# Patient Record
Sex: MALE | Race: WHITE | HISPANIC OR LATINO
[De-identification: names, ages, dates, MRNs, and addresses within clinical notes are randomized per-mention and may not be internally consistent; named-entity substitution may affect disease eponyms.]

---

## 2017-04-24 ENCOUNTER — APPOINTMENT (OUTPATIENT)
Dept: NEUROLOGY | Facility: HOSPITAL | Age: 37
End: 2017-04-24

## 2017-04-24 ENCOUNTER — OUTPATIENT (OUTPATIENT)
Dept: OUTPATIENT SERVICES | Facility: HOSPITAL | Age: 37
LOS: 1 days | End: 2017-04-24

## 2017-04-24 VITALS
SYSTOLIC BLOOD PRESSURE: 106 MMHG | DIASTOLIC BLOOD PRESSURE: 79 MMHG | BODY MASS INDEX: 26.66 KG/M2 | HEIGHT: 65 IN | WEIGHT: 160 LBS | HEART RATE: 63 BPM

## 2017-04-24 VITALS
WEIGHT: 160 LBS | HEIGHT: 65 IN | SYSTOLIC BLOOD PRESSURE: 106 MMHG | BODY MASS INDEX: 26.66 KG/M2 | DIASTOLIC BLOOD PRESSURE: 79 MMHG | HEART RATE: 63 BPM

## 2017-04-24 DIAGNOSIS — G40.909 EPILEPSY, UNSPECIFIED, NOT INTRACTABLE, WITHOUT STATUS EPILEPTICUS: ICD-10-CM

## 2017-08-02 ENCOUNTER — EMERGENCY (EMERGENCY)
Facility: HOSPITAL | Age: 37
LOS: 1 days | Discharge: HOME | End: 2017-08-02
Admitting: INTERNAL MEDICINE
Payer: SELF-PAY

## 2017-08-02 DIAGNOSIS — F10.10 ALCOHOL ABUSE, UNCOMPLICATED: ICD-10-CM
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
## 2017-08-02 DIAGNOSIS — G40.409 OTHER GENERALIZED EPILEPSY AND EPILEPTIC SYNDROMES, NOT INTRACTABLE, WITHOUT STATUS EPILEPTICUS: ICD-10-CM

## 2017-08-02 DIAGNOSIS — Z88.8 ALLERGY STATUS TO OTHER DRUGS, MEDICAMENTS AND BIOLOGICAL SUBSTANCES: ICD-10-CM

## 2017-08-02 DIAGNOSIS — Z87.891 PERSONAL HISTORY OF NICOTINE DEPENDENCE: ICD-10-CM

## 2017-08-02 PROCEDURE — 99053 MED SERV 10PM-8AM 24 HR FAC: CPT

## 2017-08-02 PROCEDURE — 93010 ELECTROCARDIOGRAM REPORT: CPT

## 2017-08-02 PROCEDURE — 99285 EMERGENCY DEPT VISIT HI MDM: CPT | Mod: 25

## 2017-08-03 PROCEDURE — 80048 BASIC METABOLIC PNL TOTAL CA: CPT

## 2017-08-03 PROCEDURE — 82553 CREATINE MB FRACTION: CPT

## 2017-08-03 PROCEDURE — 96375 TX/PRO/DX INJ NEW DRUG ADDON: CPT

## 2017-08-03 PROCEDURE — 99284 EMERGENCY DEPT VISIT MOD MDM: CPT | Mod: 25

## 2017-08-03 PROCEDURE — 85027 COMPLETE CBC AUTOMATED: CPT

## 2017-08-03 PROCEDURE — 83605 ASSAY OF LACTIC ACID: CPT

## 2017-08-03 PROCEDURE — 85610 PROTHROMBIN TIME: CPT

## 2017-08-03 PROCEDURE — 84484 ASSAY OF TROPONIN QUANT: CPT

## 2017-08-03 PROCEDURE — 80307 DRUG TEST PRSMV CHEM ANLYZR: CPT

## 2017-08-03 PROCEDURE — 83690 ASSAY OF LIPASE: CPT

## 2017-08-03 PROCEDURE — 93005 ELECTROCARDIOGRAM TRACING: CPT

## 2017-08-03 PROCEDURE — 82150 ASSAY OF AMYLASE: CPT

## 2017-08-03 PROCEDURE — 96368 THER/DIAG CONCURRENT INF: CPT

## 2017-08-03 PROCEDURE — 82550 ASSAY OF CK (CPK): CPT

## 2017-08-03 PROCEDURE — 96365 THER/PROPH/DIAG IV INF INIT: CPT

## 2017-08-03 PROCEDURE — 85730 THROMBOPLASTIN TIME PARTIAL: CPT

## 2017-08-03 PROCEDURE — 80076 HEPATIC FUNCTION PANEL: CPT

## 2017-08-03 PROCEDURE — 82009 KETONE BODYS QUAL: CPT

## 2017-08-08 ENCOUNTER — FORM ENCOUNTER (OUTPATIENT)
Age: 37
End: 2017-08-08

## 2017-08-09 ENCOUNTER — OUTPATIENT (OUTPATIENT)
Dept: OUTPATIENT SERVICES | Facility: HOSPITAL | Age: 37
LOS: 1 days | End: 2017-08-09
Payer: SELF-PAY

## 2017-08-09 ENCOUNTER — APPOINTMENT (OUTPATIENT)
Dept: FAMILY MEDICINE | Facility: HOSPITAL | Age: 37
End: 2017-08-09
Payer: SELF-PAY

## 2017-08-09 VITALS
OXYGEN SATURATION: 99 % | WEIGHT: 160 LBS | DIASTOLIC BLOOD PRESSURE: 85 MMHG | TEMPERATURE: 98.2 F | RESPIRATION RATE: 16 BRPM | HEART RATE: 74 BPM | BODY MASS INDEX: 26.63 KG/M2 | SYSTOLIC BLOOD PRESSURE: 122 MMHG

## 2017-08-09 DIAGNOSIS — M25.511 PAIN IN RIGHT SHOULDER: ICD-10-CM

## 2017-08-09 DIAGNOSIS — G40.909 EPILEPSY, UNSPECIFIED, NOT INTRACTABLE, WITHOUT STATUS EPILEPTICUS: ICD-10-CM

## 2017-08-09 DIAGNOSIS — Z78.9 OTHER SPECIFIED HEALTH STATUS: ICD-10-CM

## 2017-08-09 PROCEDURE — 36415 COLL VENOUS BLD VENIPUNCTURE: CPT

## 2017-08-09 PROCEDURE — 73030 X-RAY EXAM OF SHOULDER: CPT

## 2017-08-09 PROCEDURE — G0463: CPT

## 2017-08-09 PROCEDURE — 73030 X-RAY EXAM OF SHOULDER: CPT | Mod: 26,RT

## 2017-08-09 PROCEDURE — 80177 DRUG SCRN QUAN LEVETIRACETAM: CPT

## 2017-08-31 ENCOUNTER — APPOINTMENT (OUTPATIENT)
Dept: FAMILY MEDICINE | Facility: HOSPITAL | Age: 37
End: 2017-08-31

## 2017-09-05 ENCOUNTER — EMERGENCY (EMERGENCY)
Facility: HOSPITAL | Age: 37
LOS: 1 days | Discharge: HOME | End: 2017-09-05
Attending: INTERNAL MEDICINE | Admitting: EMERGENCY MEDICINE
Payer: SELF-PAY

## 2017-09-05 VITALS
HEIGHT: 65 IN | OXYGEN SATURATION: 97 % | RESPIRATION RATE: 16 BRPM | TEMPERATURE: 99 F | HEART RATE: 88 BPM | DIASTOLIC BLOOD PRESSURE: 75 MMHG | WEIGHT: 154.98 LBS | SYSTOLIC BLOOD PRESSURE: 119 MMHG

## 2017-09-05 VITALS
DIASTOLIC BLOOD PRESSURE: 77 MMHG | SYSTOLIC BLOOD PRESSURE: 109 MMHG | RESPIRATION RATE: 17 BRPM | TEMPERATURE: 98 F | OXYGEN SATURATION: 99 %

## 2017-09-05 PROCEDURE — 70450 CT HEAD/BRAIN W/O DYE: CPT | Mod: 26

## 2017-09-05 PROCEDURE — 99284 EMERGENCY DEPT VISIT MOD MDM: CPT

## 2017-09-05 PROCEDURE — 99284 EMERGENCY DEPT VISIT MOD MDM: CPT | Mod: 25

## 2017-09-05 PROCEDURE — 70450 CT HEAD/BRAIN W/O DYE: CPT

## 2017-09-05 RX ORDER — LEVETIRACETAM 250 MG/1
1000 TABLET, FILM COATED ORAL ONCE
Qty: 0 | Refills: 0 | Status: COMPLETED | OUTPATIENT
Start: 2017-09-05 | End: 2017-09-05

## 2017-09-05 RX ORDER — ONDANSETRON 8 MG/1
4 TABLET, FILM COATED ORAL ONCE
Qty: 0 | Refills: 0 | Status: COMPLETED | OUTPATIENT
Start: 2017-09-05 | End: 2017-09-05

## 2017-09-05 RX ADMIN — Medication 1 MILLIGRAM(S): at 08:27

## 2017-09-05 RX ADMIN — LEVETIRACETAM 400 MILLIGRAM(S): 250 TABLET, FILM COATED ORAL at 09:09

## 2017-09-05 RX ADMIN — ONDANSETRON 4 MILLIGRAM(S): 8 TABLET, FILM COATED ORAL at 08:29
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
## 2017-09-05 NOTE — ED PROVIDER NOTE - CHPI ED SYMPTOMS NEG
no numbness/no change in level of consciousness/no blurred vision/no confusion/no fever/no dizziness/no weakness
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
## 2017-10-23 ENCOUNTER — OUTPATIENT (OUTPATIENT)
Dept: OUTPATIENT SERVICES | Facility: HOSPITAL | Age: 37
LOS: 1 days | End: 2017-10-23

## 2017-10-23 ENCOUNTER — APPOINTMENT (OUTPATIENT)
Dept: NEUROLOGY | Facility: HOSPITAL | Age: 37
End: 2017-10-23

## 2017-10-23 VITALS
WEIGHT: 159 LBS | HEIGHT: 65 IN | DIASTOLIC BLOOD PRESSURE: 84 MMHG | BODY MASS INDEX: 26.49 KG/M2 | HEART RATE: 67 BPM | SYSTOLIC BLOOD PRESSURE: 110 MMHG

## 2017-10-24 DIAGNOSIS — G40.909 EPILEPSY, UNSPECIFIED, NOT INTRACTABLE, WITHOUT STATUS EPILEPTICUS: ICD-10-CM

## 2017-11-09 ENCOUNTER — APPOINTMENT (OUTPATIENT)
Dept: FAMILY MEDICINE | Facility: HOSPITAL | Age: 37
End: 2017-11-09

## 2017-11-27 ENCOUNTER — INPATIENT (INPATIENT)
Facility: HOSPITAL | Age: 37
LOS: 1 days | Discharge: HOME | DRG: 101 | End: 2017-11-29
Attending: HOSPITALIST | Admitting: HOSPITALIST
Payer: SELF-PAY

## 2017-11-27 ENCOUNTER — EMERGENCY (EMERGENCY)
Facility: HOSPITAL | Age: 37
LOS: 1 days | Discharge: HOME | End: 2017-11-27
Admitting: EMERGENCY MEDICINE
Payer: SELF-PAY

## 2017-11-27 DIAGNOSIS — G40.419 OTHER GENERALIZED EPILEPSY AND EPILEPTIC SYNDROMES, INTRACTABLE, WITHOUT STATUS EPILEPTICUS: ICD-10-CM

## 2017-11-27 DIAGNOSIS — G40.919 EPILEPSY, UNSPECIFIED, INTRACTABLE, WITHOUT STATUS EPILEPTICUS: ICD-10-CM

## 2017-11-27 PROCEDURE — 93010 ELECTROCARDIOGRAM REPORT: CPT | Mod: 76

## 2017-11-27 PROCEDURE — 70450 CT HEAD/BRAIN W/O DYE: CPT | Mod: 26

## 2017-11-27 PROCEDURE — 99222 1ST HOSP IP/OBS MODERATE 55: CPT

## 2017-11-27 PROCEDURE — 71010: CPT | Mod: 26

## 2017-11-27 PROCEDURE — 99285 EMERGENCY DEPT VISIT HI MDM: CPT

## 2017-11-28 PROCEDURE — 80307 DRUG TEST PRSMV CHEM ANLYZR: CPT

## 2017-11-28 PROCEDURE — 93005 ELECTROCARDIOGRAM TRACING: CPT

## 2017-11-28 PROCEDURE — 85027 COMPLETE CBC AUTOMATED: CPT

## 2017-11-28 PROCEDURE — 80053 COMPREHEN METABOLIC PANEL: CPT

## 2017-11-28 PROCEDURE — 80048 BASIC METABOLIC PNL TOTAL CA: CPT

## 2017-11-28 PROCEDURE — 84100 ASSAY OF PHOSPHORUS: CPT

## 2017-11-28 PROCEDURE — 99233 SBSQ HOSP IP/OBS HIGH 50: CPT | Mod: GC

## 2017-11-28 PROCEDURE — 36415 COLL VENOUS BLD VENIPUNCTURE: CPT

## 2017-11-28 PROCEDURE — 99285 EMERGENCY DEPT VISIT HI MDM: CPT | Mod: 25

## 2017-11-28 PROCEDURE — 80177 DRUG SCRN QUAN LEVETIRACETAM: CPT

## 2017-11-28 PROCEDURE — 99252 IP/OBS CONSLTJ NEW/EST SF 35: CPT

## 2017-11-28 PROCEDURE — 96374 THER/PROPH/DIAG INJ IV PUSH: CPT

## 2017-11-28 PROCEDURE — 80076 HEPATIC FUNCTION PANEL: CPT

## 2017-11-28 PROCEDURE — 83735 ASSAY OF MAGNESIUM: CPT

## 2017-11-28 PROCEDURE — 71045 X-RAY EXAM CHEST 1 VIEW: CPT

## 2017-11-28 PROCEDURE — 70450 CT HEAD/BRAIN W/O DYE: CPT

## 2017-11-28 PROCEDURE — 96375 TX/PRO/DX INJ NEW DRUG ADDON: CPT

## 2017-11-28 PROCEDURE — 99284 EMERGENCY DEPT VISIT MOD MDM: CPT | Mod: 25

## 2017-11-29 DIAGNOSIS — G40.919 EPILEPSY, UNSPECIFIED, INTRACTABLE, WITHOUT STATUS EPILEPTICUS: ICD-10-CM

## 2017-11-29 PROCEDURE — 99239 HOSP IP/OBS DSCHRG MGMT >30: CPT

## 2017-12-01 DIAGNOSIS — S09.90XA UNSPECIFIED INJURY OF HEAD, INITIAL ENCOUNTER: ICD-10-CM

## 2017-12-01 DIAGNOSIS — R56.9 UNSPECIFIED CONVULSIONS: ICD-10-CM
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
## 2017-12-01 DIAGNOSIS — S01.552A OPEN BITE OF ORAL CAVITY, INITIAL ENCOUNTER: ICD-10-CM

## 2017-12-01 DIAGNOSIS — Z79.899 OTHER LONG TERM (CURRENT) DRUG THERAPY: ICD-10-CM

## 2018-01-08 ENCOUNTER — OUTPATIENT (OUTPATIENT)
Dept: OUTPATIENT SERVICES | Facility: HOSPITAL | Age: 38
LOS: 1 days | End: 2018-01-08

## 2018-01-08 ENCOUNTER — APPOINTMENT (OUTPATIENT)
Dept: NEUROLOGY | Facility: HOSPITAL | Age: 38
End: 2018-01-08

## 2018-01-08 VITALS
WEIGHT: 160 LBS | DIASTOLIC BLOOD PRESSURE: 85 MMHG | BODY MASS INDEX: 26.66 KG/M2 | SYSTOLIC BLOOD PRESSURE: 129 MMHG | HEART RATE: 59 BPM | HEIGHT: 65 IN

## 2018-01-12 DIAGNOSIS — G40.909 EPILEPSY, UNSPECIFIED, NOT INTRACTABLE, WITHOUT STATUS EPILEPTICUS: ICD-10-CM

## 2018-01-12 DIAGNOSIS — G40.309 GENERALIZED IDIOPATHIC EPILEPSY AND EPILEPTIC SYNDROMES, NOT INTRACTABLE, WITHOUT STATUS EPILEPTICUS: ICD-10-CM

## 2018-04-23 ENCOUNTER — OUTPATIENT (OUTPATIENT)
Dept: OUTPATIENT SERVICES | Facility: HOSPITAL | Age: 38
LOS: 1 days | End: 2018-04-23

## 2018-04-23 ENCOUNTER — APPOINTMENT (OUTPATIENT)
Dept: NEUROLOGY | Facility: HOSPITAL | Age: 38
End: 2018-04-23
Payer: COMMERCIAL

## 2018-04-23 VITALS
HEART RATE: 56 BPM | BODY MASS INDEX: 25.33 KG/M2 | SYSTOLIC BLOOD PRESSURE: 109 MMHG | DIASTOLIC BLOOD PRESSURE: 78 MMHG | WEIGHT: 152 LBS | HEIGHT: 65 IN

## 2018-04-23 PROCEDURE — 99214 OFFICE O/P EST MOD 30 MIN: CPT

## 2018-04-24 DIAGNOSIS — G40.209 LOCALIZATION-RELATED (FOCAL) (PARTIAL) SYMPTOMATIC EPILEPSY AND EPILEPTIC SYNDROMES WITH COMPLEX PARTIAL SEIZURES, NOT INTRACTABLE, WITHOUT STATUS EPILEPTICUS: ICD-10-CM

## 2018-05-05 ENCOUNTER — APPOINTMENT (OUTPATIENT)
Dept: FAMILY MEDICINE | Facility: HOSPITAL | Age: 38
End: 2018-05-05

## 2018-05-05 ENCOUNTER — OUTPATIENT (OUTPATIENT)
Dept: OUTPATIENT SERVICES | Facility: HOSPITAL | Age: 38
LOS: 1 days | End: 2018-05-05
Payer: SELF-PAY

## 2018-05-05 VITALS
TEMPERATURE: 97.6 F | WEIGHT: 152 LBS | HEART RATE: 57 BPM | BODY MASS INDEX: 25.29 KG/M2 | DIASTOLIC BLOOD PRESSURE: 75 MMHG | OXYGEN SATURATION: 100 % | SYSTOLIC BLOOD PRESSURE: 112 MMHG | RESPIRATION RATE: 16 BRPM

## 2018-05-05 DIAGNOSIS — R04.0 EPISTAXIS: ICD-10-CM

## 2018-05-05 DIAGNOSIS — Z00.00 ENCOUNTER FOR GENERAL ADULT MEDICAL EXAMINATION WITHOUT ABNORMAL FINDINGS: ICD-10-CM

## 2018-05-05 PROCEDURE — G0463: CPT

## 2018-05-08 DIAGNOSIS — R04.0 EPISTAXIS: ICD-10-CM

## 2018-05-14 ENCOUNTER — APPOINTMENT (OUTPATIENT)
Dept: NEUROLOGY | Facility: HOSPITAL | Age: 38
End: 2018-05-14

## 2018-07-27 PROBLEM — Z78.9 ALCOHOL USE: Status: ACTIVE | Noted: 2017-08-10

## 2018-08-20 ENCOUNTER — EMERGENCY (EMERGENCY)
Facility: HOSPITAL | Age: 38
LOS: 1 days | Discharge: HOME | End: 2018-08-20
Attending: EMERGENCY MEDICINE | Admitting: EMERGENCY MEDICINE
Payer: SELF-PAY

## 2018-08-20 VITALS
OXYGEN SATURATION: 99 % | RESPIRATION RATE: 17 BRPM | DIASTOLIC BLOOD PRESSURE: 76 MMHG | HEART RATE: 80 BPM | TEMPERATURE: 98 F | SYSTOLIC BLOOD PRESSURE: 110 MMHG

## 2018-08-20 VITALS
DIASTOLIC BLOOD PRESSURE: 79 MMHG | SYSTOLIC BLOOD PRESSURE: 130 MMHG | RESPIRATION RATE: 18 BRPM | WEIGHT: 147.71 LBS | OXYGEN SATURATION: 99 % | HEART RATE: 59 BPM | TEMPERATURE: 99 F

## 2018-08-20 DIAGNOSIS — R41.82 ALTERED MENTAL STATUS, UNSPECIFIED: ICD-10-CM

## 2018-08-20 PROBLEM — R56.9 UNSPECIFIED CONVULSIONS: Chronic | Status: ACTIVE | Noted: 2017-09-05

## 2018-08-20 LAB
ALBUMIN SERPL ELPH-MCNC: 4.9 G/DL — SIGNIFICANT CHANGE UP (ref 3.3–5)
ALP SERPL-CCNC: 101 U/L — SIGNIFICANT CHANGE UP (ref 40–120)
ALT FLD-CCNC: 29 U/L DA — SIGNIFICANT CHANGE UP (ref 10–45)
AMPHET UR-MCNC: NEGATIVE — SIGNIFICANT CHANGE UP
ANION GAP SERPL CALC-SCNC: 22 MMOL/L — HIGH (ref 5–17)
APPEARANCE UR: CLEAR — SIGNIFICANT CHANGE UP
AST SERPL-CCNC: 23 U/L — SIGNIFICANT CHANGE UP (ref 10–40)
BARBITURATES UR SCN-MCNC: NEGATIVE — SIGNIFICANT CHANGE UP
BENZODIAZ UR-MCNC: NEGATIVE — SIGNIFICANT CHANGE UP
BILIRUB SERPL-MCNC: 0.3 MG/DL — SIGNIFICANT CHANGE UP (ref 0.2–1.2)
BILIRUB UR-MCNC: NEGATIVE — SIGNIFICANT CHANGE UP
BUN SERPL-MCNC: 21 MG/DL — SIGNIFICANT CHANGE UP (ref 7–23)
CALCIUM SERPL-MCNC: 9.5 MG/DL — SIGNIFICANT CHANGE UP (ref 8.4–10.5)
CHLORIDE SERPL-SCNC: 106 MMOL/L — SIGNIFICANT CHANGE UP (ref 96–108)
CO2 SERPL-SCNC: 15 MMOL/L — LOW (ref 22–31)
COCAINE METAB.OTHER UR-MCNC: NEGATIVE — SIGNIFICANT CHANGE UP
COLOR SPEC: YELLOW — SIGNIFICANT CHANGE UP
CREAT SERPL-MCNC: 1.23 MG/DL — SIGNIFICANT CHANGE UP (ref 0.5–1.3)
DIFF PNL FLD: NEGATIVE — SIGNIFICANT CHANGE UP
ETHANOL SERPL-MCNC: <3 MG/DL — SIGNIFICANT CHANGE UP (ref 0–3)
GLUCOSE SERPL-MCNC: 133 MG/DL — HIGH (ref 70–99)
GLUCOSE UR QL: NEGATIVE — SIGNIFICANT CHANGE UP
KETONES UR-MCNC: NEGATIVE — SIGNIFICANT CHANGE UP
LEUKOCYTE ESTERASE UR-ACNC: NEGATIVE — SIGNIFICANT CHANGE UP
METHADONE UR-MCNC: NEGATIVE — SIGNIFICANT CHANGE UP
NITRITE UR-MCNC: NEGATIVE — SIGNIFICANT CHANGE UP
OPIATES UR-MCNC: NEGATIVE — SIGNIFICANT CHANGE UP
PCP SPEC-MCNC: SIGNIFICANT CHANGE UP
PCP UR-MCNC: NEGATIVE — SIGNIFICANT CHANGE UP
PH UR: 5 — SIGNIFICANT CHANGE UP (ref 5–8)
POTASSIUM SERPL-MCNC: 3.3 MMOL/L — LOW (ref 3.5–5.3)
POTASSIUM SERPL-SCNC: 3.3 MMOL/L — LOW (ref 3.5–5.3)
PROT SERPL-MCNC: 9.2 G/DL — HIGH (ref 6–8.3)
PROT UR-MCNC: NEGATIVE — SIGNIFICANT CHANGE UP
SODIUM SERPL-SCNC: 143 MMOL/L — SIGNIFICANT CHANGE UP (ref 135–145)
SP GR SPEC: 1.01 — SIGNIFICANT CHANGE UP (ref 1.01–1.02)
THC UR QL: NEGATIVE — SIGNIFICANT CHANGE UP
UROBILINOGEN FLD QL: NEGATIVE — SIGNIFICANT CHANGE UP

## 2018-08-20 PROCEDURE — 96372 THER/PROPH/DIAG INJ SC/IM: CPT | Mod: XU

## 2018-08-20 PROCEDURE — 80053 COMPREHEN METABOLIC PANEL: CPT

## 2018-08-20 PROCEDURE — 70450 CT HEAD/BRAIN W/O DYE: CPT | Mod: 26

## 2018-08-20 PROCEDURE — 80307 DRUG TEST PRSMV CHEM ANLYZR: CPT

## 2018-08-20 PROCEDURE — 80177 DRUG SCRN QUAN LEVETIRACETAM: CPT

## 2018-08-20 PROCEDURE — 99284 EMERGENCY DEPT VISIT MOD MDM: CPT | Mod: 25

## 2018-08-20 PROCEDURE — 70450 CT HEAD/BRAIN W/O DYE: CPT

## 2018-08-20 PROCEDURE — 99285 EMERGENCY DEPT VISIT HI MDM: CPT

## 2018-08-20 PROCEDURE — 96374 THER/PROPH/DIAG INJ IV PUSH: CPT

## 2018-08-20 RX ORDER — SODIUM CHLORIDE 9 MG/ML
1000 INJECTION INTRAMUSCULAR; INTRAVENOUS; SUBCUTANEOUS ONCE
Qty: 0 | Refills: 0 | Status: COMPLETED | OUTPATIENT
Start: 2018-08-20 | End: 2018-08-20

## 2018-08-20 RX ORDER — POTASSIUM CHLORIDE 20 MEQ
40 PACKET (EA) ORAL ONCE
Qty: 0 | Refills: 0 | Status: COMPLETED | OUTPATIENT
Start: 2018-08-20 | End: 2018-08-20

## 2018-08-20 RX ORDER — LEVETIRACETAM 250 MG/1
500 TABLET, FILM COATED ORAL ONCE
Qty: 0 | Refills: 0 | Status: COMPLETED | OUTPATIENT
Start: 2018-08-20 | End: 2018-08-20

## 2018-08-20 RX ADMIN — SODIUM CHLORIDE 1000 MILLILITER(S): 9 INJECTION INTRAMUSCULAR; INTRAVENOUS; SUBCUTANEOUS at 11:08

## 2018-08-20 RX ADMIN — LEVETIRACETAM 500 MILLIGRAM(S): 250 TABLET, FILM COATED ORAL at 11:44

## 2018-08-20 RX ADMIN — Medication 2 MILLIGRAM(S): at 10:38

## 2018-08-20 RX ADMIN — LEVETIRACETAM 400 MILLIGRAM(S): 250 TABLET, FILM COATED ORAL at 11:29

## 2018-08-20 RX ADMIN — SODIUM CHLORIDE 2000 MILLILITER(S): 9 INJECTION INTRAMUSCULAR; INTRAVENOUS; SUBCUTANEOUS at 10:38

## 2018-08-20 RX ADMIN — SODIUM CHLORIDE 2000 MILLILITER(S): 9 INJECTION INTRAMUSCULAR; INTRAVENOUS; SUBCUTANEOUS at 13:15

## 2018-08-20 RX ADMIN — Medication 40 MILLIEQUIVALENT(S): at 15:41

## 2018-08-20 NOTE — ED PROVIDER NOTE - PROGRESS NOTE DETAILS
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 discussed importance of no alcohol or drug use. pt had eeg 6 yrs ago pt in stretcher, had witnessed (by myself) 30 second tonic clonic seizure with tongue biting, ativan given  pt's bloodwork drawn after seizure

## 2018-08-20 NOTE — ED ADULT NURSE REASSESSMENT NOTE - NS ED NURSE REASSESS COMMENT FT1
patient had witnessed seizure during medical assessment, Ativan 2 mg IM administered aS PER md uROVISH, Patient placed on oxygen via NC at 2 LPM>

## 2018-08-20 NOTE — ED PROVIDER NOTE - PHYSICAL EXAMINATION
Gen:  alert, awake, no acute distress; slow to answer questions  Head:  atraumatic, normocephalic  HEENT: PERRLA, EOMI, normal nose, normal oropharynx, no tonsillar edema, erythema, or exudate  CV:  rrr, nl S1, S2, no m/r/g  Pulm:  lungs CTA b/l  Abd: s/nt/nd, +BS  MSK:  moving all extremities, no back midline ttp, no stepoffs, no cva TTP  Neuro:  grossly intact, no focal deficits  Skin:  clear, dry, intact  Psych: AOx3, normal affect, no apparent risk to self or others

## 2018-08-20 NOTE — ED PROVIDER NOTE - OBJECTIVE STATEMENT
Pt with hx epiliepsy, on keppra 1500mg bid, has been compliant, no recent medication changes, had last seizure 6/8/18. Pt states he woke up this am and was feeling well, then went to work and started feeling dizzy and states not feeling well. Pt here with female, she states he is confused and slow to answer. States this has happened in past before seizures. Pt denies other complaints.

## 2018-08-20 NOTE — ED PROVIDER NOTE - NS ED ROS FT
Except as otherwise indicated in HPI:  CONSTITUTIONAL: Neg  HEENT: neg  CV: neg  Resp: neg  GI: Neg  : Neg  MSK: Neg  SKIN: Neg  NEURO: +dizziness, fatigue  PSYCHIATRIC: Neg

## 2018-08-21 LAB — LEVETIRACETAM SERPL-MCNC: 40 MCG/ML — SIGNIFICANT CHANGE UP (ref 12–46)

## 2018-09-17 ENCOUNTER — OUTPATIENT (OUTPATIENT)
Dept: OUTPATIENT SERVICES | Facility: HOSPITAL | Age: 38
LOS: 1 days | End: 2018-09-17

## 2018-09-17 ENCOUNTER — APPOINTMENT (OUTPATIENT)
Dept: NEUROLOGY | Facility: HOSPITAL | Age: 38
End: 2018-09-17

## 2018-09-17 VITALS
WEIGHT: 145 LBS | SYSTOLIC BLOOD PRESSURE: 108 MMHG | HEIGHT: 65 IN | HEART RATE: 61 BPM | BODY MASS INDEX: 24.16 KG/M2 | DIASTOLIC BLOOD PRESSURE: 65 MMHG

## 2018-09-17 DIAGNOSIS — G40.319 GENERALIZED IDIOPATHIC EPILEPSY AND EPILEPTIC SYNDROMES, INTRACTABLE, WITHOUT STATUS EPILEPTICUS: ICD-10-CM

## 2018-09-18 DIAGNOSIS — G40.909 EPILEPSY, UNSPECIFIED, NOT INTRACTABLE, WITHOUT STATUS EPILEPTICUS: ICD-10-CM

## 2018-10-22 ENCOUNTER — APPOINTMENT (OUTPATIENT)
Dept: NEUROLOGY | Facility: HOSPITAL | Age: 38
End: 2018-10-22

## 2018-10-23 ENCOUNTER — APPOINTMENT (OUTPATIENT)
Dept: NEUROLOGY | Facility: HOSPITAL | Age: 38
End: 2018-10-23

## 2018-12-06 ENCOUNTER — APPOINTMENT (OUTPATIENT)
Dept: NEUROLOGY | Facility: HOSPITAL | Age: 38
End: 2018-12-06

## 2018-12-06 ENCOUNTER — OUTPATIENT (OUTPATIENT)
Dept: OUTPATIENT SERVICES | Facility: HOSPITAL | Age: 38
LOS: 1 days | End: 2018-12-06
Payer: COMMERCIAL

## 2018-12-06 VITALS
DIASTOLIC BLOOD PRESSURE: 75 MMHG | BODY MASS INDEX: 24.16 KG/M2 | SYSTOLIC BLOOD PRESSURE: 123 MMHG | HEIGHT: 65 IN | HEART RATE: 63 BPM | RESPIRATION RATE: 14 BRPM | WEIGHT: 145 LBS

## 2018-12-06 DIAGNOSIS — G40.319 GENERALIZED IDIOPATHIC EPILEPSY AND EPILEPTIC SYNDROMES, INTRACTABLE, WITHOUT STATUS EPILEPTICUS: ICD-10-CM

## 2018-12-06 DIAGNOSIS — R56.9 UNSPECIFIED CONVULSIONS: ICD-10-CM

## 2018-12-06 PROCEDURE — G0463: CPT

## 2018-12-06 RX ORDER — RANITIDINE 150 MG/1
150 TABLET ORAL
Qty: 30 | Refills: 0 | Status: DISCONTINUED | COMMUNITY
Start: 2017-08-09 | End: 2018-12-06

## 2018-12-06 RX ORDER — FLUTICASONE PROPIONATE 50 UG/1
50 SPRAY, METERED NASAL DAILY
Qty: 16 | Refills: 0 | Status: DISCONTINUED | COMMUNITY
Start: 2018-05-05 | End: 2018-12-06

## 2018-12-06 RX ORDER — NAPROXEN 500 MG/1
500 TABLET ORAL
Qty: 20 | Refills: 0 | Status: DISCONTINUED | COMMUNITY
Start: 2017-08-09 | End: 2018-12-06

## 2018-12-30 ENCOUNTER — INPATIENT (INPATIENT)
Facility: HOSPITAL | Age: 38
LOS: 0 days | Discharge: HOME | DRG: 101 | End: 2018-12-31
Attending: PSYCHIATRY & NEUROLOGY | Admitting: PSYCHIATRY & NEUROLOGY
Payer: COMMERCIAL

## 2018-12-30 VITALS
TEMPERATURE: 98 F | DIASTOLIC BLOOD PRESSURE: 73 MMHG | RESPIRATION RATE: 18 BRPM | OXYGEN SATURATION: 98 % | SYSTOLIC BLOOD PRESSURE: 117 MMHG | HEART RATE: 54 BPM

## 2018-12-30 DIAGNOSIS — G40.909 EPILEPSY, UNSPECIFIED, NOT INTRACTABLE, WITHOUT STATUS EPILEPTICUS: ICD-10-CM

## 2018-12-30 LAB
ANION GAP SERPL CALC-SCNC: 13 MMOL/L — SIGNIFICANT CHANGE UP (ref 5–17)
APTT BLD: 30.7 SEC — SIGNIFICANT CHANGE UP (ref 27.5–36.3)
BUN SERPL-MCNC: 18 MG/DL — SIGNIFICANT CHANGE UP (ref 7–23)
CALCIUM SERPL-MCNC: 9.2 MG/DL — SIGNIFICANT CHANGE UP (ref 8.4–10.5)
CHLORIDE SERPL-SCNC: 107 MMOL/L — SIGNIFICANT CHANGE UP (ref 96–108)
CO2 SERPL-SCNC: 22 MMOL/L — SIGNIFICANT CHANGE UP (ref 22–31)
CREAT SERPL-MCNC: 1.02 MG/DL — SIGNIFICANT CHANGE UP (ref 0.5–1.3)
GLUCOSE SERPL-MCNC: 84 MG/DL — SIGNIFICANT CHANGE UP (ref 70–99)
HCT VFR BLD CALC: 42.6 % — SIGNIFICANT CHANGE UP (ref 39–50)
HGB BLD-MCNC: 14.5 G/DL — SIGNIFICANT CHANGE UP (ref 13–17)
INR BLD: 1.02 RATIO — SIGNIFICANT CHANGE UP (ref 0.88–1.16)
MCHC RBC-ENTMCNC: 29.4 PG — SIGNIFICANT CHANGE UP (ref 27–34)
MCHC RBC-ENTMCNC: 34 GM/DL — SIGNIFICANT CHANGE UP (ref 32–36)
MCV RBC AUTO: 86.4 FL — SIGNIFICANT CHANGE UP (ref 80–100)
PLATELET # BLD AUTO: 220 K/UL — SIGNIFICANT CHANGE UP (ref 150–400)
POTASSIUM SERPL-MCNC: 3.9 MMOL/L — SIGNIFICANT CHANGE UP (ref 3.5–5.3)
POTASSIUM SERPL-SCNC: 3.9 MMOL/L — SIGNIFICANT CHANGE UP (ref 3.5–5.3)
PROTHROM AB SERPL-ACNC: 11.4 SEC — SIGNIFICANT CHANGE UP (ref 10–13.1)
RBC # BLD: 4.93 M/UL — SIGNIFICANT CHANGE UP (ref 4.2–5.8)
RBC # FLD: 12.9 % — SIGNIFICANT CHANGE UP (ref 10.3–14.5)
SODIUM SERPL-SCNC: 142 MMOL/L — SIGNIFICANT CHANGE UP (ref 135–145)
WBC # BLD: 6.23 K/UL — SIGNIFICANT CHANGE UP (ref 3.8–10.5)
WBC # FLD AUTO: 6.23 K/UL — SIGNIFICANT CHANGE UP (ref 3.8–10.5)

## 2018-12-30 PROCEDURE — 95951: CPT | Mod: 26

## 2018-12-30 PROCEDURE — 95819 EEG AWAKE AND ASLEEP: CPT | Mod: 26,59

## 2018-12-30 RX ORDER — IBUPROFEN 200 MG
400 TABLET ORAL EVERY 6 HOURS
Qty: 0 | Refills: 0 | Status: DISCONTINUED | OUTPATIENT
Start: 2018-12-30 | End: 2018-12-31

## 2018-12-30 RX ORDER — LEVETIRACETAM 250 MG/1
1500 TABLET, FILM COATED ORAL
Qty: 0 | Refills: 0 | Status: DISCONTINUED | OUTPATIENT
Start: 2018-12-30 | End: 2018-12-31

## 2018-12-30 RX ORDER — ENOXAPARIN SODIUM 100 MG/ML
40 INJECTION SUBCUTANEOUS EVERY 24 HOURS
Qty: 0 | Refills: 0 | Status: DISCONTINUED | OUTPATIENT
Start: 2018-12-30 | End: 2018-12-31

## 2018-12-30 RX ORDER — ZONISAMIDE 100 MG
200 CAPSULE ORAL AT BEDTIME
Qty: 0 | Refills: 0 | Status: DISCONTINUED | OUTPATIENT
Start: 2018-12-30 | End: 2018-12-31

## 2018-12-30 RX ORDER — LEVETIRACETAM 250 MG/1
1250 TABLET, FILM COATED ORAL
Qty: 0 | Refills: 0 | COMMUNITY

## 2018-12-30 RX ADMIN — ENOXAPARIN SODIUM 40 MILLIGRAM(S): 100 INJECTION SUBCUTANEOUS at 18:10

## 2018-12-30 RX ADMIN — LEVETIRACETAM 1500 MILLIGRAM(S): 250 TABLET, FILM COATED ORAL at 18:10

## 2018-12-30 RX ADMIN — Medication 200 MILLIGRAM(S): at 21:19

## 2018-12-30 NOTE — H&P ADULT - ASSESSMENT
Pt is a 37 yo M with a PMH of Seizures (started after alcohol withdrawal in 2011, though continued unprovoked since [pt currently abstinent of alcohol), here for elective EMU admission to characterize seizure type of breakthrough events.    Plan:  [] vEEG  [x] c/w home Keppra 1500mg BID  [x] c/w home Zonisamide 200mg HS  [x] Ativan 2mg IV prn rescue  [x] Fall/Seizure precautions  [x] DVT ppx Pt is a 39 yo M with a PMH of Seizures (started after alcohol withdrawal in 2011, though continued unprovoked since [pt currently abstinent of alcohol), here for elective EMU admission to characterize seizure type of breakthrough events.    Plan:  [] vEEG  [x] c/w home Keppra 1500mg BID  [x] c/w home Zonisamide 200mg HS  [x] Ativan 2mg IV prn rescue  [x] Fall/Seizure precautions  [x] DVT ppx    VEEG done and found generalized spike and wave discharges.  At this point, will leave on home medications.  Patient with depression and started on Lexapro 10mg upon dc with side effects and risks discussed.  No driving per DMV guidelines; discussed with patient.

## 2018-12-30 NOTE — H&P ADULT - NSHPPHYSICALEXAM_GEN_ALL_CORE
General Exam:   General appearance: No acute distress    Neurological Exam:  Mental Status: Orientated to self, date and place.  Attention intact.  No dysarthria. Speech fluent.  Cranial Nerves:   PERRL, EOMI, VFF, no nystagmus.    CN V1-3 intact to light touch .  No facial asymmetry.  Hearing intact to finger rub bilaterally.  Tongue, uvula and palate midline.  Sternocleidomastoid and Trapezius intact bilaterally.    Motor:   Tone: normal.                  Strength:     [] Upper extremity                      Delt       Bicep    Tricep                                                  R         5/5 5/5 5/5 5/5                                               L          5/5 5/5 5/5 5/5  [] Lower extremity                       HF          KE          KF        DF         PF                                               R        5/5 5/5 5/5 5/5 5/5                                               L         5/5 5/5 5/5 5/5        5/5  Pronator drift: none                 Dysmetria: None to finger-nose-finger b/l  No truncal ataxia.    Tremor: No resting, postural or action tremor.  No myoclonus.    Sensation: intact to light touch throughout, no extinction    Toes downgoing b/l    Gait: normal.

## 2018-12-30 NOTE — H&P ADULT - NSHPLABSRESULTS_GEN_ALL_CORE
Vital Signs Last 24 Hrs  T(C): 36.4 (30 Dec 2018 11:43), Max: 36.4 (30 Dec 2018 11:43)  T(F): 97.6 (30 Dec 2018 11:43), Max: 97.6 (30 Dec 2018 11:43)  HR: 54 (30 Dec 2018 11:43) (54 - 54)  BP: 117/73 (30 Dec 2018 11:43) (117/73 - 117/73)  BP(mean): --  RR: 18 (30 Dec 2018 11:43) (18 - 18)  SpO2: 98% (30 Dec 2018 11:43) (98% - 98%)    MEDICATIONS  (STANDING):  enoxaparin Injectable 40 milliGRAM(s) SubCutaneous every 24 hours  levETIRAcetam  Solution 1500 milliGRAM(s) Oral two times a day  zonisamide 200 milliGRAM(s) Oral at bedtime    MEDICATIONS  (PRN):  LORazepam   Injectable 2 milliGRAM(s) IV Push every 5 minutes PRN Seizure activity

## 2018-12-30 NOTE — H&P ADULT - HISTORY OF PRESENT ILLNESS
Pt is a 37 yo M with a PMH of Seizures (started after alcohol withdrawal in 2011, though continued unprovoked since [pt currently abstinent of alcohol), here for elective EMU admission to characterize seizure type. Pt is still having breakthrough events, even though he is compliant with medications. The semiology of the events is poorly described, usually happens in the am (before 9AM) with a prodrome of feeling slow, confused and nonspecific dizziness, followed by generalized shaking of the upper and lower extremities for 1-2 minutes, eyes open no deviation. Short postictal period (10 min) last seizure in September 2018. Currently pt feels fine and denies any recent fevers, chills, HA, dizziness, numbness, tingling, weakness, speech difficulty, vision change or gait difficulty.

## 2018-12-31 ENCOUNTER — TRANSCRIPTION ENCOUNTER (OUTPATIENT)
Age: 38
End: 2018-12-31

## 2018-12-31 VITALS
TEMPERATURE: 98 F | OXYGEN SATURATION: 100 % | DIASTOLIC BLOOD PRESSURE: 66 MMHG | SYSTOLIC BLOOD PRESSURE: 104 MMHG | RESPIRATION RATE: 18 BRPM | HEART RATE: 55 BPM

## 2018-12-31 PROCEDURE — 99223 1ST HOSP IP/OBS HIGH 75: CPT

## 2018-12-31 PROCEDURE — 95951: CPT | Mod: 26,52

## 2018-12-31 RX ORDER — ESCITALOPRAM OXALATE 10 MG/1
10 TABLET, FILM COATED ORAL DAILY
Qty: 0 | Refills: 0 | Status: DISCONTINUED | OUTPATIENT
Start: 2018-12-31 | End: 2018-12-31

## 2018-12-31 RX ORDER — ESCITALOPRAM OXALATE 10 MG/1
1 TABLET, FILM COATED ORAL
Qty: 30 | Refills: 0
Start: 2018-12-31 | End: 2019-01-29

## 2018-12-31 RX ADMIN — LEVETIRACETAM 1500 MILLIGRAM(S): 250 TABLET, FILM COATED ORAL at 05:27

## 2018-12-31 RX ADMIN — ESCITALOPRAM OXALATE 10 MILLIGRAM(S): 10 TABLET, FILM COATED ORAL at 12:35

## 2018-12-31 NOTE — DISCHARGE NOTE ADULT - HOSPITAL COURSE
Pt is a 39 yo M with a PMH of Seizures (started after alcohol withdrawal in 2011, though continued unprovoked since [pt currently abstinent of alcohol), here for elective EMU admission to characterize seizure type of breakthrough events.      c/w home Keppra 1500mg BID  c/w home Zonisamide 200mg HS    VEEG done and found generalized spike and wave discharges.  Patient will be discharged on home medications without any changes.   Patient was also found to have depression and thus started on Lexapro 10mg.  No driving per DMV guidelines; discussed with patient.  Follow outpatient with

## 2018-12-31 NOTE — DISCHARGE NOTE ADULT - PATIENT PORTAL LINK FT
You can access the [facility], offered by [facility], by registering with the following website: [email or web address]

## 2018-12-31 NOTE — DISCHARGE NOTE ADULT - NS AS ACTIVITY OBS
Do not make important decisions/No Heavy lifting/straining/Do not drive or operate machinery No Heavy lifting/straining/Do not drive or operate machinery

## 2018-12-31 NOTE — DISCHARGE NOTE ADULT - CARE PLAN
Principal Discharge DX:	Epilepsy  Goal:	follow outpatient with   Assessment and plan of treatment:	take prescribed medications

## 2018-12-31 NOTE — DISCHARGE NOTE ADULT - MEDICATION SUMMARY - MEDICATIONS TO TAKE
I will START or STAY ON the medications listed below when I get home from the hospital:    Keppra  -- 1500 milligram(s) by mouth 2 times a day  -- Indication: For epilepsy    zonisamide 100 mg oral capsule  -- 2 cap(s) by mouth once a day (at bedtime)  -- Indication: For epilepsy    escitalopram 10 mg oral tablet  -- 1 tab(s) by mouth once a day  -- Indication: For depression

## 2018-12-31 NOTE — DISCHARGE NOTE ADULT - CARE PROVIDER_API CALL
Tremayne Mcmahon (MD), Clinical Neurophysiology; EEGEpilepsy; Neurology  [address] [address]  Phone: [phone]  Fax: [phone]

## 2019-01-09 PROCEDURE — 85730 THROMBOPLASTIN TIME PARTIAL: CPT

## 2019-01-09 PROCEDURE — 85610 PROTHROMBIN TIME: CPT

## 2019-01-09 PROCEDURE — 95951: CPT

## 2019-01-09 PROCEDURE — 85027 COMPLETE CBC AUTOMATED: CPT

## 2019-01-09 PROCEDURE — 95819 EEG AWAKE AND ASLEEP: CPT

## 2019-01-09 PROCEDURE — 80048 BASIC METABOLIC PNL TOTAL CA: CPT

## 2019-03-18 ENCOUNTER — EMERGENCY (EMERGENCY)
Facility: HOSPITAL | Age: 39
LOS: 1 days | Discharge: HOME | End: 2019-03-18
Attending: EMERGENCY MEDICINE | Admitting: EMERGENCY MEDICINE
Payer: COMMERCIAL

## 2019-03-18 VITALS
DIASTOLIC BLOOD PRESSURE: 81 MMHG | HEART RATE: 66 BPM | TEMPERATURE: 98 F | RESPIRATION RATE: 17 BRPM | WEIGHT: 145.95 LBS | SYSTOLIC BLOOD PRESSURE: 126 MMHG | HEIGHT: 65 IN | OXYGEN SATURATION: 99 %

## 2019-03-18 PROCEDURE — 99282 EMERGENCY DEPT VISIT SF MDM: CPT

## 2019-03-18 NOTE — ED PROVIDER NOTE - NSFOLLOWUPINSTRUCTIONS_ED_ALL_ED_FT
Follow up with the ENT specialist, call the number above for an appointment     Stay hydrated    Return to the ER if your symptoms worsen, high fevers, severe pain or for any other medical emergencies

## 2019-03-18 NOTE — ED ADULT NURSE NOTE - OBJECTIVE STATEMENT
pt presents to ED with c/o nose bleed x4 years. the pt states  that he had an episode of nose bleeding this morning which has stopped upon arrival to ED. the pt was evaluated by the FARRUKH Pina and referred to a specialist

## 2019-03-18 NOTE — ED PROVIDER NOTE - OBJECTIVE STATEMENT
37 y/o M with PMH of seizures d/o presents to the ED with c/o frequent noses bleeds every winter for the past 4 years. Patient mostly bleeds in the mornings and afternoon after blowing his nose. He 39 y/o M with PMH of seizures d/o presents to the ED with c/o frequent noses bleeds every winter for the past 4 years. Patient mostly bleeds in the mornings and afternoon after blowing his nose. He denies HA, dizziness, use of anticoagulant, nasal trauma or all other complaints. Patient comes to the ER requesting ENT referral.

## 2019-03-18 NOTE — ED PROVIDER NOTE - CARE PROVIDER_API CALL
Christopher Boyd)  Otolaryngology  [address] [place] [address], [address]  [place] [address]  Phone: [phone]  Fax: [phone]  Follow Up Time:

## 2019-03-18 NOTE — ED PROVIDER NOTE - ATTENDING CONTRIBUTION TO CARE
Braulio with [facility] Hernandez. No active nose bleed at the moment. Vitals stable. Pt says he didn't go to his doc or get ENT referral because it takes too long. He came here today for ENT care. We explained there is no active bleed. He will need to f/u with ENT for his 4 year long complaint and he is very upset and ignorant to the recommendations set forth. Patient instructed to f/u and offered meds/nasal spray. I performed a face to face bedside interview with patient regarding history of present illness, review of symptoms and past medical history. I completed an independent physical exam.  I have discussed the patient's plan of care with Physician Assistant (PA). I agree with note as stated above, having amended the EMR as needed to reflect my findings.   This includes History of Present Illness, HIV, Past Medical/Surgical/Family/Social History, Allergies and Home Medications, Review of Systems, Physical Exam, and any Progress Notes during the time I functioned as the attending physician for this patient.

## 2019-03-25 ENCOUNTER — OUTPATIENT (OUTPATIENT)
Dept: OUTPATIENT SERVICES | Facility: HOSPITAL | Age: 39
LOS: 1 days | End: 2019-03-25
Payer: COMMERCIAL

## 2019-03-25 ENCOUNTER — APPOINTMENT (OUTPATIENT)
Age: 39
End: 2019-03-25

## 2019-03-25 VITALS
RESPIRATION RATE: 14 BRPM | HEART RATE: 60 BPM | TEMPERATURE: 98.2 F | SYSTOLIC BLOOD PRESSURE: 113 MMHG | DIASTOLIC BLOOD PRESSURE: 79 MMHG | OXYGEN SATURATION: 97 % | BODY MASS INDEX: 25.63 KG/M2 | WEIGHT: 154 LBS

## 2019-03-25 DIAGNOSIS — Z30.09 ENCOUNTER FOR OTHER GENERAL COUNSELING AND ADVICE ON CONTRACEPTION: ICD-10-CM

## 2019-03-25 DIAGNOSIS — Z00.00 ENCOUNTER FOR GENERAL ADULT MEDICAL EXAMINATION WITHOUT ABNORMAL FINDINGS: ICD-10-CM

## 2019-03-25 PROCEDURE — G0463: CPT

## 2019-03-25 NOTE — HISTORY OF PRESENT ILLNESS
[FreeTextEntry8] : 38 year old male with PMH of Seizures is here to discuss sterilization. Pt states he has a 17 year old . Does not want any more kids. Pt is sure about his decision.  Denies any complaints. No Fever, Chills, Nausea, Vomiting, Diarrhea, Headache, Chest Pain, Shortness of breath or Abdominal pain.\par \par Pt is compliant with his meds and his last seizure was in June 2018. Has good follow up with Neurology.\par

## 2019-03-25 NOTE — HEALTH RISK ASSESSMENT
[0-5] : 0-5 [] : No [No falls in past year] : Patient reported no falls in the past year [0] : 2) Feeling down, depressed, or hopeless: Not at all (0) [[id number]] : 0

## 2019-03-25 NOTE — ASSESSMENT
[FreeTextEntry1] : # Sterilization\par - Discussed Vasectomy with the patient in detail\par - Answered all questions\par - Urology Consult\par \par f/u PRN

## 2019-03-26 DIAGNOSIS — Z30.9 ENCOUNTER FOR CONTRACEPTIVE MANAGEMENT, UNSPECIFIED: ICD-10-CM

## 2019-04-04 ENCOUNTER — OUTPATIENT (OUTPATIENT)
Dept: OUTPATIENT SERVICES | Facility: HOSPITAL | Age: 39
LOS: 1 days | End: 2019-04-04
Payer: COMMERCIAL

## 2019-04-04 ENCOUNTER — MEDICATION RENEWAL (OUTPATIENT)
Age: 39
End: 2019-04-04

## 2019-04-04 ENCOUNTER — APPOINTMENT (OUTPATIENT)
Dept: NEUROLOGY | Facility: HOSPITAL | Age: 39
End: 2019-04-04

## 2019-04-04 VITALS
WEIGHT: 148 LBS | HEART RATE: 64 BPM | SYSTOLIC BLOOD PRESSURE: 112 MMHG | HEIGHT: 65 IN | RESPIRATION RATE: 12 BRPM | DIASTOLIC BLOOD PRESSURE: 76 MMHG | BODY MASS INDEX: 24.66 KG/M2

## 2019-04-04 DIAGNOSIS — R56.9 UNSPECIFIED CONVULSIONS: ICD-10-CM

## 2019-04-04 DIAGNOSIS — G40.319 GENERALIZED IDIOPATHIC EPILEPSY AND EPILEPTIC SYNDROMES, INTRACTABLE, WITHOUT STATUS EPILEPTICUS: ICD-10-CM

## 2019-04-04 PROCEDURE — G0463: CPT

## 2019-04-04 NOTE — HISTORY OF PRESENT ILLNESS
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 Short postictal period ( 10 min) \par poor sleep habits due to stress.\par \par has fluctuating mood and he is very nervous , possibly worsening with keppra.\par Patient does not drink x 2yrs or drive.  LFTs normalized last 3 yrs\par Patient informs he is not driving.\par \par HPI:\par right-handed M with no pertinent past medical/surgical/family history presenting for follow up for seizures. Onset of sz 7 years ago. Pt has been diagnosed with primary generalized epilepsy by EEG in 01/2014 (generalized 4Hz spikes and waves)     {?though prior EEG per 2010 Goldy Rodriguez stated some concern for right temporal slowing/sharply contoured]. He has a history of binge alcohol use (mostly on social events), he has since stopped drinking alcohol for > 2 year. Pt typically always knows he would have a seizure hours before because of fogginess and slowed cognitive processing. All of witnessed seizures have be BL tonic-clonic seizures without semiology to suggest focality. All seizures occur before 9am. No identifiable trigger.  Had 2 convulsive seizures in November 2017 AM, each lasted few minutes in duration. He was seen at [place] where they increased his Keppra to 1500 BID. States that these seizures were similar to all of his previous seizures where he had hours of feeling cognitive fogginess and slow processing, then followed by full body tonic-clonic convulsion. No obvious trigger associated with the last breakthrough sz.\par

## 2019-04-04 NOTE — END OF VISIT
[] : Resident [FreeTextEntry3] : Pt poorly controlled x 3 yrs on LEV.  now adj ZNS.  ongoing sz, mood issues.  plan to x titrate VPA, monitor LFTs

## 2019-04-04 NOTE — PHYSICAL EXAM
[FreeTextEntry1] : Cut to lip, stitched\par \par MS: awake alert oriented to self person and place\par follows commands\par no dysarthia\par CN: PERRL, EOMI, VF intact, v1-v3 intact, tongue midline, shoulder shrug equal\par Motor: 5/5 throughout \par Sensation: FT intact \par DTRs: 2+ throughout\par Gait: intact \par Coordiantion: FTN\par
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
## 2019-04-16 ENCOUNTER — APPOINTMENT (OUTPATIENT)
Dept: UROLOGY | Facility: HOSPITAL | Age: 39
End: 2019-04-16

## 2019-04-18 ENCOUNTER — APPOINTMENT (OUTPATIENT)
Dept: NEUROLOGY | Facility: HOSPITAL | Age: 39
End: 2019-04-18

## 2019-04-23 ENCOUNTER — MOBILE ON CALL (OUTPATIENT)
Age: 39
End: 2019-04-23

## 2019-06-04 LAB
ALBUMIN SERPL ELPH-MCNC: 4.5 G/DL
ALP BLD-CCNC: 79 U/L
ALT SERPL-CCNC: 21 U/L
ANION GAP SERPL CALC-SCNC: 12 MMOL/L
AST SERPL-CCNC: 20 U/L
BASOPHILS # BLD AUTO: 0.04 K/UL
BASOPHILS NFR BLD AUTO: 0.6 %
BILIRUB SERPL-MCNC: 0.2 MG/DL
BUN SERPL-MCNC: 17 MG/DL
CALCIUM SERPL-MCNC: 9.1 MG/DL
CHLORIDE SERPL-SCNC: 107 MMOL/L
CO2 SERPL-SCNC: 22 MMOL/L
CREAT SERPL-MCNC: 1.03 MG/DL
EOSINOPHIL # BLD AUTO: 0.38 K/UL
EOSINOPHIL NFR BLD AUTO: 6.1 %
GLUCOSE SERPL-MCNC: 96 MG/DL
HCT VFR BLD CALC: 43.6 %
HGB BLD-MCNC: 14.6 G/DL
IMM GRANULOCYTES NFR BLD AUTO: 0.3 %
LYMPHOCYTES # BLD AUTO: 1.48 K/UL
LYMPHOCYTES NFR BLD AUTO: 23.8 %
MAN DIFF?: NORMAL
MCHC RBC-ENTMCNC: 29.8 PG
MCHC RBC-ENTMCNC: 33.5 GM/DL
MCV RBC AUTO: 89 FL
MONOCYTES # BLD AUTO: 0.63 K/UL
MONOCYTES NFR BLD AUTO: 10.1 %
NEUTROPHILS # BLD AUTO: 3.67 K/UL
NEUTROPHILS NFR BLD AUTO: 59.1 %
PLATELET # BLD AUTO: 192 K/UL
POTASSIUM SERPL-SCNC: 3.9 MMOL/L
PROT SERPL-MCNC: 7.3 G/DL
RBC # BLD: 4.9 M/UL
RBC # FLD: 12.7 %
SODIUM SERPL-SCNC: 141 MMOL/L
VALPROATE SERPL-MCNC: 66 UG/ML
WBC # FLD AUTO: 6.22 K/UL

## 2019-06-06 ENCOUNTER — OUTPATIENT (OUTPATIENT)
Dept: OUTPATIENT SERVICES | Facility: HOSPITAL | Age: 39
LOS: 1 days | End: 2019-06-06
Payer: COMMERCIAL

## 2019-06-06 ENCOUNTER — APPOINTMENT (OUTPATIENT)
Dept: NEUROLOGY | Facility: HOSPITAL | Age: 39
End: 2019-06-06

## 2019-06-06 ENCOUNTER — RX CHANGE (OUTPATIENT)
Age: 39
End: 2019-06-06

## 2019-06-06 VITALS
RESPIRATION RATE: 14 BRPM | BODY MASS INDEX: 25.33 KG/M2 | SYSTOLIC BLOOD PRESSURE: 112 MMHG | DIASTOLIC BLOOD PRESSURE: 74 MMHG | HEART RATE: 73 BPM | WEIGHT: 152 LBS | HEIGHT: 65 IN

## 2019-06-06 DIAGNOSIS — R56.9 UNSPECIFIED CONVULSIONS: ICD-10-CM

## 2019-06-06 PROCEDURE — G0463: CPT

## 2019-06-06 RX ORDER — DIVALPROEX SODIUM 500 MG/1
500 TABLET, DELAYED RELEASE ORAL TWICE DAILY
Qty: 180 | Refills: 0 | Status: DISCONTINUED | COMMUNITY
Start: 2019-04-04 | End: 2019-06-06

## 2019-06-07 NOTE — PHYSICAL EXAM
[FreeTextEntry1] : MS: awake alert oriented to self person and place\par follows commands\par no dysarthia\par CN: PERRL, EOMI, VF intact, v1-v3 intact, tongue midline, shoulder shrug equal\par Motor: 5/5 throughout \par Sensation: FT intact \par DTRs: 2+ throughout\par Gait: intact \par Coordiantion: FTN\par

## 2019-06-07 NOTE — HISTORY OF PRESENT ILLNESS
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 \par \par The semiology of the seizure is poorly described, usually happen in the am with a prodrome of feeling slow and confused and nonspecific dizziness, followed by generalized shaking upper and lower extremities for 1-2 minutes, eyes open no deviation . Short postictal period ( 10 min) \par poor sleep habits due to stress.\par \par He is currently taking  mg BID and Zonegran 300 mg qhs. He has not had any seizures in two months. Was feeling very anxious in the mornings when he was starting VPA but now is feeling back to normal. He believes he had increased seizure frequency 2 months ago due to stress of moving in with his girlfriend and her daughter and less sleep. \par \par Patient informs he is not driving.\par \par Previous meds: Keppra\par

## 2019-06-07 NOTE — DISCUSSION/SUMMARY
[Medically Refractory (seizure within the last year)] : Medically Refractory (seizure within the last year) [Generalized] : generalized  [Idiopathic] : idiopathic  [Generalized or Multifocal] : generalized or multifocal [Risks Associated with Driving/[place] Law] : As per my usual protocol, the patient was advised in regards to risks and driving privileges associated with the [place] Guidelines.  [Safety Recommendations] : The patient was advised in regards to the risk of seizures and general seizure safety recommendations including not to be bathing alone, climbing to high places and operating heavy machinery. [Compliance with Medications] : The importance of compliance with medications was reinforced. [Medication Side Effects] : High frequency and serious potential medication adverse effects were reviewed with the patient, including but not exclusive to psychiatric effects.  Information sheets on medication side effects were made available to the patient in our clinic.  The patient or advocate agrees to notify us for any concerns. [Sleep Hygiene/Sleep Disruption Risks] : Sleep hygiene and the risks of sleep disruption were discussed. [Inpatient video EEG study ordered with length of stay anticipated in days: _____] : Inpatient video EEG study ordered with length of stay anticipated in days: [unfilled] [Evaluation for interictal epileptiform activity, subclinical seizures, and risk stratification (typically 2-3 days)] : Evaluation for interictal epileptiform activity, subclinical seizures, and risk stratification (typically 2-3 days) [FreeTextEntry1] : 37 yo man with primary generalized epilepsy presenting for follow-up. Tapered off keppra due to anxiety and currently on VPA and zonisamide, doing well with no breakthrough seizures or adverse effects. VPA level was 66 on 5/23. All other labs were WNL. \par \par PLAN:\par 1. Continue zonisamide 300 mg qhs\par 2. Change VPA to  mg BID\par 3. RTC 3 months\par Understands that he cannot drive and DMV regulations discussed.  \par

## 2019-06-18 DIAGNOSIS — G40.311 GENERALIZED IDIOPATHIC EPILEPSY AND EPILEPTIC SYNDROMES, INTRACTABLE, WITH STATUS EPILEPTICUS: ICD-10-CM

## 2019-09-05 ENCOUNTER — APPOINTMENT (OUTPATIENT)
Dept: NEUROLOGY | Facility: HOSPITAL | Age: 39
End: 2019-09-05

## 2019-09-09 ENCOUNTER — RX RENEWAL (OUTPATIENT)
Age: 39
End: 2019-09-09

## 2019-10-17 ENCOUNTER — APPOINTMENT (OUTPATIENT)
Dept: NEUROLOGY | Facility: HOSPITAL | Age: 39
End: 2019-10-17

## 2019-10-17 ENCOUNTER — OUTPATIENT (OUTPATIENT)
Dept: OUTPATIENT SERVICES | Facility: HOSPITAL | Age: 39
LOS: 1 days | End: 2019-10-17
Payer: COMMERCIAL

## 2019-10-17 VITALS
RESPIRATION RATE: 16 BRPM | WEIGHT: 158 LBS | HEIGHT: 65 IN | BODY MASS INDEX: 26.33 KG/M2 | DIASTOLIC BLOOD PRESSURE: 76 MMHG | SYSTOLIC BLOOD PRESSURE: 114 MMHG | HEART RATE: 74 BPM

## 2019-10-17 DIAGNOSIS — Z86.69 PERSONAL HISTORY OF OTHER DISEASES OF THE NERVOUS SYSTEM AND SENSE ORGANS: ICD-10-CM

## 2019-10-17 DIAGNOSIS — R56.9 UNSPECIFIED CONVULSIONS: ICD-10-CM

## 2019-10-17 PROCEDURE — G0463: CPT

## 2019-10-20 NOTE — HISTORY OF PRESENT ILLNESS
[FreeTextEntry1] : 38 yo man with history of idiopathic generalized epilepsy with generalized spike wave activity on EEG 2019 presenting for follow up. \par \par The semiology of the seizure is poorly described, usually happen in the am with a prodrome of feeling slow and confused and nonspecific dizziness, followed by generalized shaking upper and lower extremities for 1-2 minutes, eyes open no deviation . Short postictal period ( 10 min) \par poor sleep habits due to stress.\par \par He is currently taking VPA 500mg XR BID and Zonegran 300 mg qhs. He has not had any seizures in two months. Was feeling very anxious in the mornings when he was starting VPA but now is feeling back to normal. He believes he had increased seizure frequency 2 months ago due to stress of moving in with his girlfriend and her daughter and less sleep. \par \par Patient informs he is not driving.\par \par Previous meds: Keppra\par

## 2019-10-20 NOTE — DISCUSSION/SUMMARY
[Medically Refractory (seizure within the last year)] : Medically Refractory (seizure within the last year) [Generalized] : generalized  [Idiopathic] : idiopathic  [Generalized or Multifocal] : generalized or multifocal [[place]isks Associated with D[place] Law] : As per my usual protocol, the patient was advised in regards to risks and driving privileges associated with the [place] Guidelines.  [Safety Recommendations] : The patient was advised in regards to the risk of seizures and general seizure safety recommendations including not to be bathing alone, climbing to high places and operating heavy machinery. [Compliance with Medications] : The importance of compliance with medications was reinforced. [Medication Side Effects] : High frequency and serious potential medication adverse effects were reviewed with the patient, including but not exclusive to psychiatric effects.  Information sheets on medication side effects were made available to the patient in our clinic.  The patient or advocate agrees to notify us for any concerns. [Sleep Hygiene/Sleep Disruption Risks] : Sleep hygiene and the risks of sleep disruption were discussed. [Inpatient video EEG study ordered with length of stay anticipated in days: _____] : Inpatient video EEG study ordered with length of stay anticipated in days: [unfilled] [Evaluation for interictal epileptiform activity, subclinical seizures, and risk stratification (typically 2-3 days)] : Evaluation for interictal epileptiform activity, subclinical seizures, and risk stratification (typically 2-3 days) [FreeTextEntry1] : 40 yo man with primary generalized epilepsy presenting for follow-up. Tapered off keppra due to anxiety and currently on VPA and zonisamide, doing well with no breakthrough seizures or adverse effects. VPA level was 66 on 5/23. All other labs were WNL.\par \par PLAN:\par 1. Continue zonisamide 300 mg qhs\par 2. Change VPA to  mg BID\par 3. RTC 6 months\par 4. Understands that he cannot drive and DMV regulations discussed\par

## 2019-10-21 DIAGNOSIS — G40.311 GENERALIZED IDIOPATHIC EPILEPSY AND EPILEPTIC SYNDROMES, INTRACTABLE, WITH STATUS EPILEPTICUS: ICD-10-CM

## 2019-10-21 DIAGNOSIS — Z86.69 PERSONAL HISTORY OF OTHER DISEASES OF THE NERVOUS SYSTEM AND SENSE ORGANS: ICD-10-CM

## 2019-12-12 NOTE — ED PROVIDER NOTE - MEDICAL DECISION MAKING DETAILS
pt with hx seizures, now breakthrough seizure, has been compliant with meds, lanie check bloodwork, ct head, meds, neuro consult 12-Dec-2019

## 2020-04-30 NOTE — DISCUSSION/SUMMARY
[Medically Refractory (seizure within the last year)] : Medically Refractory (seizure within the last year) [Generalized] : generalized  [Idiopathic] : idiopathic  [Generalized or Multifocal] : generalized or multifocal [Risks Associated with Driving/[place] Law] : As per my usual protocol, the patient was advised in regards to risks and driving privileges associated with the [place] Guidelines.  [Safety Recommendations] : The patient was advised in regards to the risk of seizures and general seizure safety recommendations including not to be bathing alone, climbing to high places and operating heavy machinery. [Medication Side Effects] : High frequency and serious potential medication adverse effects were reviewed with the patient, including but not exclusive to psychiatric effects.  Information sheets on medication side effects were made available to the patient in our clinic.  The patient or advocate agrees to notify us for any concerns. [Compliance with Medications] : The importance of compliance with medications was reinforced. [Sleep Hygiene/Sleep Disruption Risks] : Sleep hygiene and the risks of sleep disruption were discussed. [Evaluation for interictal epileptiform activity, subclinical seizures, and risk stratification (typically 2-3 days)] : Evaluation for interictal epileptiform activity, subclinical seizures, and risk stratification (typically 2-3 days) [Inpatient video EEG study ordered with length of stay anticipated in days: _____] : Inpatient video EEG study ordered with length of stay anticipated in days: [unfilled] [FreeTextEntry1] : 40 yo man with primary generalized epilepsy presenting for follow-up. Tapered off keppra due to anxiety and currently on VPA and zonisamide, doing well with no breakthrough seizures or adverse effects. \par \par Discussed that his appointment will need to be moved to June/July due to COVID-19 safety concerns. He showed understanding.\par \par PLAN:\par 1. Continue zonisamide 300 mg qhs\par 2. Change VPA to  mg BID\par 3. Appointment to be made for June/July\par 4. Understands that he cannot drive and DMV regulations discussed\par

## 2020-04-30 NOTE — HISTORY OF PRESENT ILLNESS
[FreeTextEntry1] : 4/28/2020 Phone call follow up: Mr. Munoz reports that he has been doing well and has not had any seizures. States that he needs refills of his medications. Denies any side effects.\par \par Interval Hx:\par 40 yo man with history of idiopathic generalized epilepsy with generalized spike wave activity on EEG 2019 presenting for follow up. \par \par The semiology of the seizure is poorly described, usually happen in the am with a prodrome of feeling slow and confused and nonspecific dizziness, followed by generalized shaking upper and lower extremities for 1-2 minutes, eyes open no deviation . Short postictal period ( 10 min) \par poor sleep habits due to stress.\par \par He is currently taking VPA 500mg XR BID and Zonegran 300 mg qhs. He has not had any seizures in two months. Was feeling very anxious in the mornings when he was starting VPA but now is feeling back to normal. He believes he had increased seizure frequency 2 months ago due to stress of moving in with his girlfriend and her daughter and less sleep. \par \par Patient informs he is not driving.\par \par Previous meds: Keppra\par

## 2020-06-29 NOTE — DISCUSSION/SUMMARY
[Medically Refractory (seizure within the last year)] : Medically Refractory (seizure within the last year) [Idiopathic] : idiopathic  [Generalized] : generalized  [Generalized or Multifocal] : generalized or multifocal [Compliance with Medications] : The importance of compliance with medications was reinforced. [Risks Associated with Driving/[place] Law] : As per my usual protocol, the patient was advised in regards to risks and driving privileges associated with the [place] Guidelines.  [Safety Recommendations] : The patient was advised in regards to the risk of seizures and general seizure safety recommendations including not to be bathing alone, climbing to high places and operating heavy machinery. [Medication Side Effects] : High frequency and serious potential medication adverse effects were reviewed with the patient, including but not exclusive to psychiatric effects.  Information sheets on medication side effects were made available to the patient in our clinic.  The patient or advocate agrees to notify us for any concerns. [Sleep Hygiene/Sleep Disruption Risks] : Sleep hygiene and the risks of sleep disruption were discussed. [Inpatient video EEG study ordered with length of stay anticipated in days: _____] : Inpatient video EEG study ordered with length of stay anticipated in days: [unfilled] [Evaluation for interictal epileptiform activity, subclinical seizures, and risk stratification (typically 2-3 days)] : Evaluation for interictal epileptiform activity, subclinical seizures, and risk stratification (typically 2-3 days) [FreeTextEntry1] : 41 yo man with primary generalized epilepsy presenting for follow-up. Tapered off keppra due to anxiety and currently on VPA and zonisamide, doing well with no breakthrough seizures or adverse effects. \par \par Discussed that his 7/2 appointment will need to be moved to a later date due to COVID-19 safety concerns. He showed understanding.\par \par PLAN:\par 1. Continue zonisamide 300 mg qhs\par 2. Change VPA to  mg BID\par

## 2020-06-29 NOTE — HISTORY OF PRESENT ILLNESS
[FreeTextEntry1] : 6/26/2020 Phoen call follow up: Mr. Munoz reports that he has not had any seizures in over a year and is doing well on his current AED regimen. Will need refills. No complaints at this time.\par \par 4/28/2020 Phone call follow up: Mr. Munoz reports that he has been doing well and has not had any seizures. States that he needs refills of his medications. 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 \par \par The semiology of the seizure is poorly described, usually happen in the am with a prodrome of feeling slow and confused and nonspecific dizziness, followed by generalized shaking upper and lower extremities for 1-2 minutes, eyes open no deviation . Short postictal period ( 10 min) \par poor sleep habits due to stress.\par \par He is currently taking VPA 500mg XR BID and Zonegran 300 mg qhs. He has not had any seizures in two months. Was feeling very anxious in the mornings when he was starting VPA but now is feeling back to normal. He believes he had increased seizure frequency 2 months ago due to stress of moving in with his girlfriend and her daughter and less sleep. \par \par Patient informs he is not driving.\par \par Previous meds: Keppra\par

## 2020-08-06 ENCOUNTER — OUTPATIENT (OUTPATIENT)
Dept: OUTPATIENT SERVICES | Facility: HOSPITAL | Age: 40
LOS: 1 days | End: 2020-08-06
Payer: COMMERCIAL

## 2020-08-06 ENCOUNTER — APPOINTMENT (OUTPATIENT)
Dept: NEUROLOGY | Facility: HOSPITAL | Age: 40
End: 2020-08-06

## 2020-08-06 VITALS
HEIGHT: 65 IN | TEMPERATURE: 98.2 F | RESPIRATION RATE: 14 BRPM | BODY MASS INDEX: 26.49 KG/M2 | DIASTOLIC BLOOD PRESSURE: 72 MMHG | SYSTOLIC BLOOD PRESSURE: 114 MMHG | WEIGHT: 159 LBS | HEART RATE: 62 BPM

## 2020-08-06 DIAGNOSIS — R56.9 UNSPECIFIED CONVULSIONS: ICD-10-CM

## 2020-08-06 PROCEDURE — G0463: CPT

## 2020-08-09 NOTE — DISCUSSION/SUMMARY
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 Tapered off keppra due to anxiety and currently on VPA and zonisamide, doing well with no breakthrough seizures or adverse effects. \par \par PLAN:\par 1. Continue zonisamide 300 mg qhs\par 2. Continue depakote 500 XR BID\par 3. Depakote level, CBC, CMP\par \par The patient inquired about being able to drive since he hasn’t had seizures in over 1.5 years. Discussed with epilepsy fellow and attending, patient is cleared to drive, and will be informed to bring DMV paperwork to be filled out. \par

## 2020-08-09 NOTE — PHYSICAL EXAM
[FreeTextEntry1] : MS: awake alert AOX3, follows commands\par no dysarthia\par CN: PERRL, EOMI, VF intact, v1-v3 intact, tongue midline, shoulder shrug equal\par Motor: 5/5 throughout \par Sensation: FT intact \par DTRs: 2+ throughout\par Gait: intact \par Coordiantion: FTN

## 2020-08-09 NOTE — HISTORY OF PRESENT ILLNESS
[FreeTextEntry1] : 6/26/2020 Phone call follow up: Mr. Munoz reports that he has not had any seizures in over a year and is doing well on his current AED regimen. Got refills. No complaints at this time. Depakote changed to  mg BID\par \par 4/28/2020 Phone call follow up: Mr. Munoz reports that he has been doing well and has not had any seizures. Got refills. Denies any side effects.\par \par Interval Hx:\par 40 yo man with history of idiopathic generalized epilepsy with generalized spike wave activity on EEG 2019 presenting for follow up. Last depakote level is from 5/2019 which was therapeutic (66)\par \par The semiology of the seizure is poorly described, usually happen in the am with a prodrome of feeling slow and confused and nonspecific dizziness, followed by generalized shaking upper and lower extremities for 1-2 minutes, eyes open no deviation . Short postictal period (10 min) \par poor sleep habits due to stress.\par \par He is currently taking VPA 500mg XR BID and Zonegran 300 mg qhs. He has not had any seizures in over a year. Was feeling very anxious in the mornings when he was starting VPA but now is feeling back to normal. He believes he had increased seizure frequency before due to stress of moving in with his girlfriend and her daughter and less sleep. He does not drive.\par Previous meds: Keppra\par

## 2020-08-10 DIAGNOSIS — G40.419 OTHER GENERALIZED EPILEPSY AND EPILEPTIC SYNDROMES, INTRACTABLE, WITHOUT STATUS EPILEPTICUS: ICD-10-CM

## 2021-02-04 ENCOUNTER — APPOINTMENT (OUTPATIENT)
Dept: NEUROLOGY | Facility: HOSPITAL | Age: 41
End: 2021-02-04

## 2021-02-04 ENCOUNTER — OUTPATIENT (OUTPATIENT)
Dept: OUTPATIENT SERVICES | Facility: HOSPITAL | Age: 41
LOS: 1 days | End: 2021-02-04
Payer: MEDICAID

## 2021-02-04 VITALS
SYSTOLIC BLOOD PRESSURE: 123 MMHG | HEIGHT: 66 IN | HEART RATE: 82 BPM | TEMPERATURE: 98.1 F | WEIGHT: 160 LBS | BODY MASS INDEX: 25.71 KG/M2 | DIASTOLIC BLOOD PRESSURE: 76 MMHG

## 2021-02-04 DIAGNOSIS — R56.9 UNSPECIFIED CONVULSIONS: ICD-10-CM

## 2021-02-04 PROCEDURE — G0463: CPT

## 2021-02-04 NOTE — HISTORY OF PRESENT ILLNESS
[FreeTextEntry1] : 2/4/21: here for 6 month follow up. CBC/CMP/depakote level wnl. Patient has been seizure free for approx 2 years now. 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 He has no history of sleep deprivation triggering his seizures. He was advised to sleep during the day as much as he can, limit stress (which he is already doing), and if he feels unwell while driving, to pull over immediately, and he is and will remain compliant with his medications. \par \par 6/26/2020 Phone call follow up: Mr. Munoz reports that he has not had any seizures in over a year and is doing well on his current AED regimen. Got refills. No complaints at this time. Depakote changed to  mg BID\par \par 4/28/2020 Phone call follow up: Mr. Munoz reports that he has been doing well and has not had any seizures. Got refills. 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 Last depakote level is from 5/2019 which was therapeutic (66)\par \par The semiology of the seizure is poorly described, usually happen in the am with a prodrome of feeling slow and confused and nonspecific dizziness, followed by generalized shaking upper and lower extremities for 1-2 minutes, eyes open no deviation . Short postictal period (10 min) \par poor sleep habits due to stress.\par \par He is currently taking VPA 500mg XR BID and Zonegran 300 mg qhs. He has not had any seizures in over a year. Was feeling very anxious in the mornings when he was starting VPA but now is feeling back to normal. He believes he had increased seizure frequency before due to stress of moving in with his girlfriend and her daughter and less sleep. He does not drive.\par Previous meds: Keppra\par

## 2021-02-04 NOTE — DISCUSSION/SUMMARY
[FreeTextEntry1] : 41 yo man with primary generalized epilepsy presenting for follow-up. Tapered off keppra due to anxiety and currently on VPA and zonisamide, doing well with no breakthrough seizures or adverse effects. Labs (CBC/ CMP/ depakote level) from 8/2020 wnl. During his last visit in 8/2020 he was cleared to drive given that he has been seizure free for >2 years. Discussed as above regarding taking night time shifts at his new job, patient understands risks and will continue to take his medications, and will follow up in 6 months. \par \par PLAN:\par 1. Continue zonisamide 300 mg qhs\par 2. Continue depakote 500 XR BID\par 3. follow up in 6 months, earlier if need be\par 4. medications renewed today\par

## 2021-02-11 DIAGNOSIS — G40.309 GENERALIZED IDIOPATHIC EPILEPSY AND EPILEPTIC SYNDROMES, NOT INTRACTABLE, WITHOUT STATUS EPILEPTICUS: ICD-10-CM

## 2021-04-08 ENCOUNTER — TRANSCRIPTION ENCOUNTER (OUTPATIENT)
Age: 41
End: 2021-04-08

## 2021-08-05 ENCOUNTER — OUTPATIENT (OUTPATIENT)
Dept: OUTPATIENT SERVICES | Facility: HOSPITAL | Age: 41
LOS: 1 days | End: 2021-08-05
Payer: MEDICAID

## 2021-08-05 ENCOUNTER — APPOINTMENT (OUTPATIENT)
Dept: NEUROLOGY | Facility: HOSPITAL | Age: 41
End: 2021-08-05

## 2021-08-05 VITALS
WEIGHT: 169 LBS | DIASTOLIC BLOOD PRESSURE: 76 MMHG | TEMPERATURE: 97.5 F | SYSTOLIC BLOOD PRESSURE: 112 MMHG | HEIGHT: 66 IN | BODY MASS INDEX: 27.16 KG/M2 | HEART RATE: 72 BPM

## 2021-08-05 DIAGNOSIS — R56.9 UNSPECIFIED CONVULSIONS: ICD-10-CM

## 2021-08-05 LAB
ALBUMIN SERPL ELPH-MCNC: 4.5 G/DL — SIGNIFICANT CHANGE UP (ref 3.3–5)
ALP SERPL-CCNC: 92 U/L — SIGNIFICANT CHANGE UP (ref 40–120)
ALT FLD-CCNC: 40 U/L — SIGNIFICANT CHANGE UP (ref 10–45)
ANION GAP SERPL CALC-SCNC: 13 MMOL/L — SIGNIFICANT CHANGE UP (ref 5–17)
AST SERPL-CCNC: 31 U/L — SIGNIFICANT CHANGE UP (ref 10–40)
BASOPHILS # BLD AUTO: 0.03 K/UL — SIGNIFICANT CHANGE UP (ref 0–0.2)
BASOPHILS NFR BLD AUTO: 0.6 % — SIGNIFICANT CHANGE UP (ref 0–2)
BILIRUB SERPL-MCNC: 0.2 MG/DL — SIGNIFICANT CHANGE UP (ref 0.2–1.2)
BUN SERPL-MCNC: 17 MG/DL — SIGNIFICANT CHANGE UP (ref 7–23)
CALCIUM SERPL-MCNC: 9.5 MG/DL — SIGNIFICANT CHANGE UP (ref 8.4–10.5)
CHLORIDE SERPL-SCNC: 104 MMOL/L — SIGNIFICANT CHANGE UP (ref 96–108)
CO2 SERPL-SCNC: 21 MMOL/L — LOW (ref 22–31)
CREAT SERPL-MCNC: 1.24 MG/DL — SIGNIFICANT CHANGE UP (ref 0.5–1.3)
EOSINOPHIL # BLD AUTO: 0.44 K/UL — SIGNIFICANT CHANGE UP (ref 0–0.5)
EOSINOPHIL NFR BLD AUTO: 8.6 % — HIGH (ref 0–6)
GLUCOSE SERPL-MCNC: 102 MG/DL — HIGH (ref 70–99)
HCT VFR BLD CALC: 43.2 % — SIGNIFICANT CHANGE UP (ref 39–50)
HGB BLD-MCNC: 14.9 G/DL — SIGNIFICANT CHANGE UP (ref 13–17)
IMM GRANULOCYTES NFR BLD AUTO: 0.2 % — SIGNIFICANT CHANGE UP (ref 0–1.5)
LYMPHOCYTES # BLD AUTO: 1.56 K/UL — SIGNIFICANT CHANGE UP (ref 1–3.3)
LYMPHOCYTES # BLD AUTO: 30.6 % — SIGNIFICANT CHANGE UP (ref 13–44)
MCHC RBC-ENTMCNC: 30.5 PG — SIGNIFICANT CHANGE UP (ref 27–34)
MCHC RBC-ENTMCNC: 34.5 GM/DL — SIGNIFICANT CHANGE UP (ref 32–36)
MCV RBC AUTO: 88.3 FL — SIGNIFICANT CHANGE UP (ref 80–100)
MONOCYTES # BLD AUTO: 0.69 K/UL — SIGNIFICANT CHANGE UP (ref 0–0.9)
MONOCYTES NFR BLD AUTO: 13.5 % — SIGNIFICANT CHANGE UP (ref 2–14)
NEUTROPHILS # BLD AUTO: 2.37 K/UL — SIGNIFICANT CHANGE UP (ref 1.8–7.4)
NEUTROPHILS NFR BLD AUTO: 46.5 % — SIGNIFICANT CHANGE UP (ref 43–77)
PLATELET # BLD AUTO: 188 K/UL — SIGNIFICANT CHANGE UP (ref 150–400)
POTASSIUM SERPL-MCNC: 4 MMOL/L — SIGNIFICANT CHANGE UP (ref 3.5–5.3)
POTASSIUM SERPL-SCNC: 4 MMOL/L — SIGNIFICANT CHANGE UP (ref 3.5–5.3)
PROT SERPL-MCNC: 7.3 G/DL — SIGNIFICANT CHANGE UP (ref 6–8.3)
RBC # BLD: 4.89 M/UL — SIGNIFICANT CHANGE UP (ref 4.2–5.8)
RBC # FLD: 12.8 % — SIGNIFICANT CHANGE UP (ref 10.3–14.5)
SODIUM SERPL-SCNC: 139 MMOL/L — SIGNIFICANT CHANGE UP (ref 135–145)
WBC # BLD: 5.1 K/UL — SIGNIFICANT CHANGE UP (ref 3.8–10.5)
WBC # FLD AUTO: 5.1 K/UL — SIGNIFICANT CHANGE UP (ref 3.8–10.5)

## 2021-08-05 PROCEDURE — G0463: CPT

## 2021-08-05 PROCEDURE — 80053 COMPREHEN METABOLIC PANEL: CPT

## 2021-08-05 PROCEDURE — 80164 ASSAY DIPROPYLACETIC ACD TOT: CPT

## 2021-08-05 PROCEDURE — 85025 COMPLETE CBC W/AUTO DIFF WBC: CPT

## 2021-08-05 PROCEDURE — 80165 DIPROPYLACETIC ACID FREE: CPT

## 2021-08-06 LAB — VALPROATE SERPL-MCNC: 42 UG/ML — LOW (ref 50–100)

## 2021-08-06 NOTE — PHYSICAL EXAM
[FreeTextEntry1] : MS: awake alert AOX3, follows commands; no dysarthia\par CN: PERRL, EOMI, VF intact, v1-v3 intact, tongue midline, shoulder shrug equal\par Motor: 5/5 throughout, no resting, postural, or action tremors\par Sensation: intact to LT \par DTRs: 2+ throughout\par Gait: intact \par Coordination: FTN

## 2021-08-06 NOTE — HISTORY OF PRESENT ILLNESS
[FreeTextEntry1] : 42 yo man with primary generalized epilepsy presenting for follow-up. Pt denies any seizures or changes in medication since last office visit 2/2021 (no seizures for >2years).  He denies any adverse effects (no tremors, abdominal pain, easy bruising or bleeding, cloudy mentation). He reports some mood lability (specifically getting angry easily), likely unrelated to seizure medications. No issues driving. Discussed importance of preventing sleep deprivation given night time job shifts.\par \par He is currently taking VPA 500mg XR BID and Zonegran 300 mg qhs. \par Previous meds: Keppra\par \par 2/4/21: here for 6 month follow up. CBC/CMP/depakote level wnl. Patient has been seizure free for approx 2 years now. 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 He has no history of sleep deprivation triggering his seizures. He was advised to sleep during the day as much as he can, limit stress (which he is already doing), and if he feels unwell while driving, to pull over immediately, and he is and will remain compliant with his medications. \par \par 6/26/2020 Phone call follow up: Mr. Munoz reports that he has not had any seizures in over a year and is doing well on his current AED regimen. Got refills. No complaints at this time. Depakote changed to  mg BID\par \par 4/28/2020 Phone call follow up: Mr. Munoz reports that he has been doing well and has not had any seizures. Got refills. Denies any side effects.\par \par 2019: 40 yo man with history of idiopathic generalized epilepsy with generalized spike wave activity on EEG 2019 presenting for follow up. Last depakote level is from 5/2019 which was therapeutic (66). The semiology of the seizure is poorly described, usually happen in the am with a prodrome of feeling slow and confused and nonspecific dizziness, followed by generalized shaking upper and lower extremities for 1-2 minutes, eyes open no deviation . Short postictal period (10 min). poor sleep habits due to stress.\par \par

## 2021-08-06 NOTE — DISCUSSION/SUMMARY
[FreeTextEntry1] : 40 yo man with primary generalized epilepsy presenting for follow-up. Pt denies any seizures or changes in medication since last office visit 2/2021 (no seizures for >2years). He is currently on VPA and zonisamide, doing well with no breakthrough seizures or adverse effects (no tremors, abdominal pain, easy bruising or bleeding, cloudy mentation). He reports some mood lability (specifically getting angry easily), likely unrelated to seizure medications. No issues driving. Discussed importance of preventing sleep deprivation given night time job shifts.\par \par PLAN:\par 1. Continue zonisamide 300 mg qhs\par 2. Continue depakote 500 XR BID\par 3. Labwork to check levels\par 4. follow up in 6 months, earlier if need be\par 5. medications renewed today\par 6. pt given letter for driving clearance\par

## 2021-08-09 DIAGNOSIS — G40.419 OTHER GENERALIZED EPILEPSY AND EPILEPTIC SYNDROMES, INTRACTABLE, WITHOUT STATUS EPILEPTICUS: ICD-10-CM

## 2021-08-09 LAB — VALPROATE FREE SERPL-MCNC: 6.8 UG/ML — SIGNIFICANT CHANGE UP (ref 6–22)

## 2021-08-12 ENCOUNTER — NON-APPOINTMENT (OUTPATIENT)
Age: 41
End: 2021-08-12

## 2021-08-21 ENCOUNTER — EMERGENCY (EMERGENCY)
Facility: HOSPITAL | Age: 41
LOS: 1 days | Discharge: HOME | End: 2021-08-21
Attending: INTERNAL MEDICINE | Admitting: INTERNAL MEDICINE
Payer: MEDICAID

## 2021-08-21 VITALS
DIASTOLIC BLOOD PRESSURE: 83 MMHG | HEIGHT: 65 IN | HEART RATE: 67 BPM | TEMPERATURE: 98 F | RESPIRATION RATE: 18 BRPM | SYSTOLIC BLOOD PRESSURE: 120 MMHG | WEIGHT: 164.91 LBS

## 2021-08-21 VITALS — RESPIRATION RATE: 18 BRPM | OXYGEN SATURATION: 100 %

## 2021-08-21 LAB
ALBUMIN SERPL ELPH-MCNC: 3.9 G/DL — SIGNIFICANT CHANGE UP (ref 3.3–5)
ALP SERPL-CCNC: 100 U/L — SIGNIFICANT CHANGE UP (ref 40–120)
ALT FLD-CCNC: 47 U/L — HIGH (ref 10–45)
ANION GAP SERPL CALC-SCNC: 8 MMOL/L — SIGNIFICANT CHANGE UP (ref 5–17)
AST SERPL-CCNC: 22 U/L — SIGNIFICANT CHANGE UP (ref 10–40)
BASOPHILS # BLD AUTO: 0.03 K/UL — SIGNIFICANT CHANGE UP (ref 0–0.2)
BASOPHILS NFR BLD AUTO: 0.4 % — SIGNIFICANT CHANGE UP (ref 0–2)
BILIRUB SERPL-MCNC: 0.2 MG/DL — SIGNIFICANT CHANGE UP (ref 0.2–1.2)
BUN SERPL-MCNC: 17 MG/DL — SIGNIFICANT CHANGE UP (ref 7–23)
CALCIUM SERPL-MCNC: 8.9 MG/DL — SIGNIFICANT CHANGE UP (ref 8.4–10.5)
CHLORIDE SERPL-SCNC: 104 MMOL/L — SIGNIFICANT CHANGE UP (ref 96–108)
CO2 SERPL-SCNC: 27 MMOL/L — SIGNIFICANT CHANGE UP (ref 22–31)
CREAT SERPL-MCNC: 1.04 MG/DL — SIGNIFICANT CHANGE UP (ref 0.5–1.3)
EOSINOPHIL # BLD AUTO: 0.41 K/UL — SIGNIFICANT CHANGE UP (ref 0–0.5)
EOSINOPHIL NFR BLD AUTO: 5.8 % — SIGNIFICANT CHANGE UP (ref 0–6)
GLUCOSE SERPL-MCNC: 109 MG/DL — HIGH (ref 70–99)
HCT VFR BLD CALC: 43.5 % — SIGNIFICANT CHANGE UP (ref 39–50)
HGB BLD-MCNC: 15.1 G/DL — SIGNIFICANT CHANGE UP (ref 13–17)
IMM GRANULOCYTES NFR BLD AUTO: 0.3 % — SIGNIFICANT CHANGE UP (ref 0–1.5)
LIDOCAIN IGE QN: 113 U/L — SIGNIFICANT CHANGE UP (ref 73–393)
LYMPHOCYTES # BLD AUTO: 2.6 K/UL — SIGNIFICANT CHANGE UP (ref 1–3.3)
LYMPHOCYTES # BLD AUTO: 36.5 % — SIGNIFICANT CHANGE UP (ref 13–44)
MCHC RBC-ENTMCNC: 30.1 PG — SIGNIFICANT CHANGE UP (ref 27–34)
MCHC RBC-ENTMCNC: 34.7 GM/DL — SIGNIFICANT CHANGE UP (ref 32–36)
MCV RBC AUTO: 86.8 FL — SIGNIFICANT CHANGE UP (ref 80–100)
MONOCYTES # BLD AUTO: 0.68 K/UL — SIGNIFICANT CHANGE UP (ref 0–0.9)
MONOCYTES NFR BLD AUTO: 9.6 % — SIGNIFICANT CHANGE UP (ref 2–14)
NEUTROPHILS # BLD AUTO: 3.38 K/UL — SIGNIFICANT CHANGE UP (ref 1.8–7.4)
NEUTROPHILS NFR BLD AUTO: 47.4 % — SIGNIFICANT CHANGE UP (ref 43–77)
NRBC # BLD: 0 /100 WBCS — SIGNIFICANT CHANGE UP (ref 0–0)
PLATELET # BLD AUTO: 205 K/UL — SIGNIFICANT CHANGE UP (ref 150–400)
POTASSIUM SERPL-MCNC: 3.6 MMOL/L — SIGNIFICANT CHANGE UP (ref 3.5–5.3)
POTASSIUM SERPL-SCNC: 3.6 MMOL/L — SIGNIFICANT CHANGE UP (ref 3.5–5.3)
PROT SERPL-MCNC: 7.5 G/DL — SIGNIFICANT CHANGE UP (ref 6–8.3)
RBC # BLD: 5.01 M/UL — SIGNIFICANT CHANGE UP (ref 4.2–5.8)
RBC # FLD: 12.6 % — SIGNIFICANT CHANGE UP (ref 10.3–14.5)
SARS-COV-2 RNA SPEC QL NAA+PROBE: SIGNIFICANT CHANGE UP
SODIUM SERPL-SCNC: 139 MMOL/L — SIGNIFICANT CHANGE UP (ref 135–145)
WBC # BLD: 7.12 K/UL — SIGNIFICANT CHANGE UP (ref 3.8–10.5)
WBC # FLD AUTO: 7.12 K/UL — SIGNIFICANT CHANGE UP (ref 3.8–10.5)

## 2021-08-21 PROCEDURE — 71045 X-RAY EXAM CHEST 1 VIEW: CPT | Mod: 26

## 2021-08-21 PROCEDURE — 99283 EMERGENCY DEPT VISIT LOW MDM: CPT | Mod: 25

## 2021-08-21 PROCEDURE — 99284 EMERGENCY DEPT VISIT MOD MDM: CPT

## 2021-08-21 PROCEDURE — 36415 COLL VENOUS BLD VENIPUNCTURE: CPT

## 2021-08-21 PROCEDURE — 83690 ASSAY OF LIPASE: CPT

## 2021-08-21 PROCEDURE — 85025 COMPLETE CBC W/AUTO DIFF WBC: CPT

## 2021-08-21 PROCEDURE — 71045 X-RAY EXAM CHEST 1 VIEW: CPT

## 2021-08-21 PROCEDURE — 87635 SARS-COV-2 COVID-19 AMP PRB: CPT

## 2021-08-21 PROCEDURE — 80053 COMPREHEN METABOLIC PANEL: CPT

## 2021-08-21 RX ORDER — FAMOTIDINE 10 MG/ML
20 INJECTION INTRAVENOUS ONCE
Refills: 0 | Status: COMPLETED | OUTPATIENT
Start: 2021-08-21 | End: 2021-08-21

## 2021-08-21 RX ORDER — FAMOTIDINE 10 MG/ML
1 INJECTION INTRAVENOUS
Qty: 60 | Refills: 0
Start: 2021-08-21 | End: 2021-09-19

## 2021-08-21 RX ADMIN — FAMOTIDINE 20 MILLIGRAM(S): 10 INJECTION INTRAVENOUS at 17:23

## 2021-08-21 NOTE — ED ADULT TRIAGE NOTE - [facility] (M2)
Outpatient Progress Note      CHIEF COMPLAINT:    Chief Complaint   Patient presents with   • Follow-up       HISTORY OF PRESENT ILLNESS:      The patient is a 57 year old male who presents with complaints of HTN.  Started years ago.  Onset was gradual.  Symptoms described as none.  Severity reported as mild.  Symptoms occurring never. Aggravated by nothing.  Alleviated by nothing.  Associated symptoms include none.    The patient is a 57 year old male who presents with complaints of Anxiety.  Started years ago.  Onset was gradual.  Symptoms described as above.  Severity reported as severe.  Symptoms occurring daily. Aggravated by nothing.  Alleviated by meds.  Associated symptoms include none. Seeing psych.    The patient is a 56 year old male who presents for complaints of a tobacco use.  Started years ago.  Onset was grdaual.  Symptoms described as addiction.  Severity described as moderate.  Symptoms occurring constantly.  Associated symptoms include none.  Alleviated by nothing.  Aggravated by nothing.    The patient is a 57 year old male who presents with complaints of ED.  Started years ago.  Onset was gradual.  Symptoms described as above.  Severity reported as severe.  Symptoms occurring daily. Aggravated by nothing.  Alleviated by Viagra.  Associated symptoms include none.    Past Medical History:  Past Medical History:   Diagnosis Date   • Depression    • Ischemic bowel disease (CMS/HCC)    • Lumbago     chonic back pain   • Nephrolithiasis    • Polysubstance abuse (CMS/HCC)    • Psychosis (CMS/HCC) 10/06/2017   • Short gut syndrome        Past Surgical History:  Past Surgical History:   Procedure Laterality Date   • Colostomy      July 2017   • Forearm/wrist surgery unlisted      broken rt arm metal plate   • Laminectomy,lumbar      L5 S1       Current Medications:  Current Outpatient Medications   Medication Sig Dispense Refill   • cyclobenzaprine (FLEXERIL) 5 MG tablet Take 1 tablet by mouth 3 times  daily as needed for Muscle spasms. 90 tablet 3   • traZODone (DESYREL) 100 MG tablet TAKE ONE TABLET BY MOUTH DAILY 30 tablet 0   • lurasidone (LATUDA) 40 MG tablet Take 1 tablet by mouth daily (with breakfast). 90 tablet 0   • Nutritional Supplements (ENSURE) Liquid Take 1 Can by mouth daily. 30 Can 3   • PREVALITE 4 g packet take 1 packet BY MOUTH twice daily WITH MEALS 60 packet 2   • clopidogrel (PLAVIX) 75 MG tablet TAKE 1 Tablet BY MOUTH EVERY  tablet 1   • tamsulosin (FLOMAX) 0.4 MG Cap Take 2 capsules by mouth daily after a meal. 180 capsule 0   • sildenafil (VIAGRA) 100 MG tablet Take 1 tablet by mouth daily as needed for Erectile Dysfunction. 30 tablet 12   • fluticasone (FLONASE) 50 MCG/ACT nasal spray INSTILL 1-2 SPRAYS INTO EACH NOSTRIL EVERY DAY 3 Bottle 3   • Pancrelipase, Lip-Prot-Amyl, (ZENPEP) 66958 units per capsule Take 1 capsule by mouth 3 times daily. 270 capsule 1   • ondansetron (ZOFRAN ODT) 4 MG disintegrating tablet Place 1 tablet onto the tongue every 8 hours as needed for Nausea. 90 tablet 12   • folic acid (FOLATE) 1 MG tablet Take 1 tablet by mouth daily. 90 tablet 0   • Thiamine HCl (B-1) 100 MG Tab TAKE 1 Tablet BY MOUTH EVERY DAY 90 tablet 0   • pantoprazole (PROTONIX) 40 MG tablet TAKE 1 Tablet BY MOUTH EVERY DAY 30 tablet 3   • nicotine (NICODERM CQ) 14 MG/24HR patch Place 1 patch onto the skin every 24 hours. 28 patch 12   • loratadine (CLARITIN) 10 MG tablet Take 1 tablet by mouth daily. 90 tablet 3   • LORazepam (ATIVAN) 2 MG tablet Take 0.5 tablets by mouth every 6 hours as needed for Anxiety. 60 tablet 0   • ferrous sulfate 325 (65 FE) MG tablet Take 325 mg by mouth daily (with breakfast).       No current facility-administered medications for this visit.        Allergies:  ALLERGIES:   Allergen Reactions   • Penicillins CARDIAC DISTURBANCES       SOCIAL HISTORY:  Social History     Tobacco Use   • Smoking status: Current Every Day Smoker     Packs/day: 1.00     Types:  Cigarettes   • Smokeless tobacco: Never Used   Substance Use Topics   • Alcohol use: Yes     Alcohol/week: 3.3 standard drinks     Types: 4 Standard drinks or equivalent per week     Frequency: 2-4 times a month     Drinks per session: 3 or 4     Binge frequency: Never     Comment: 1-2 times per week   • Drug use: Yes     Types: Heroin, Cocaine     Comment: patient states that it is no longer an issue.         Drug Use:    Yes                Special: Heroin, Cocaine       Comment: patient states that it is no longer an                 issue.      FAMILY HISTORY:  No family history on file.    REVIEW OF SYSTEMS:  CONSTITUTIONAL:  No Fevers/Chills/Weight loss/Fatigue/Night sweats  EYES:  No visual changes  HEAD/EARS/NOSE/THROAT/MOUTH:  No Headache  NECK:  No Swelling/Masses/Neck pain/Neck stiffness  RESPIRATORY:  No Cough/Sputum/Hemoptysis/Shortness of breath/Wheezing  CARDIOVASCULAR:  No Chest pain/Dyspnea on exertion/Paroxysmal nocturnal dyspnea/Orthopnea/Edema/Syncope/Palpitations/Diaphoresis/Lightheadedness  GASTROINTESTINAL:  No Melena/Bright red blood per rectum, Positive for Abdominal pain/Nausea/Vomiting/Diarrhea  GENITOURINARY: Positive for ED  INTEGUMENTARY:  No Rashes/Pruritus/Lumps  ENDOCRINE:  No Heat or Cold intolerance/Hair or Nail changes/Polydipsia/Polyuria  HEMATOLOGIC/LYMPHATIC:  No Easy bruising/Easy bleeding/Enlarged lymph nodes  ALLERGY/IMMUNOLOGIC:  No Asthma/Eczema/Rhinitis/Hives  MUSCULOSKELETAL:  No Joint pain/Joint swelling/Joint stiffness, Positive for Back pain  NEUROLOGICAL:  No Weakness/Tremor, Positive for Numbness/Tingling  PSYCHIATRIC:  No Depression, Positive for Insomnia/Anxiety/Panic attacks    PHYSICAL EXAM:  Visit Vitals  /74   Pulse 74   Wt 49.9 kg   SpO2 96%   BMI 17.23 kg/m²       CONSTITUTIONAL:  No acute distress, Non-toxic appearing  EYES:  Pupils equal, round, reactive to light and accommodation, Extraocular movements intact, Conjunctivae are clear/pink- no signs of  inflammation, Lids are normal, No scleral icterus  HEAD, EARS, NOSE, MOUTH, THROAT:  Normocephalic/Atraumatic, External exam of nose and ears is normal- No Lesions/Masses/Tenderness, External auditory canals normal bilaterally, Tympanic membranes are normal bilaterally with no erythema or bulging and no effusions or discharge- normal light reflex, Hearing normal bilaterally, Nasal mucosa is pink with no sign Erythema/Inflammation and no discharge seen, Inferior/Middle nasal turbinates are not Inflamed/Erythematous, Nasal septum is midline, Oral mucosa, Salivary glands, Hard/Soft palates, Tongue, Tonsils, and Posterior pharynx are normal with no sign of Exudate/Erythema/Petechiae/Post-nasal drip, Moist mucous membranes, Good dentition, Lips/Gums normal with no sign of Swelling/Infection/Bleeding  NECK:  Supple, Normal range of motion, Overall normal appearance, No Masses/Swelling, Trachea midline, Thyroid normal size and consistency- No Thyromegaly or Nodules/Lesions appreciated  RESPIRATORY:  Clear to auscultation bilaterally, Good air entry, Symmetrical expansion with respiration.  No Accessory muscle use/Retractions, No Wheezes/Rhonchi  CARDIOVASCULAR:  Regular rate and rhythm.  Normal S1/S2.  No S3/S4, No Murmurs/Rubs/Gallops.  No lower extremity edema or varicosities.  No jugular venous distention. No bruit heard in bilateral carotid arteries- normal upstroke and amplitude bilaterally.  Femoral, Posterior tibial and Dorsalis pedis pulses 2+ bilaterally  GASTROINTESTINAL:  Scaphoid abdomen.  Soft, Non-tender, Non-distended, Bowel sounds present, No Hepatomegaly/Splenomegaly, No masses or hernias, No costovertebral angle tenderness.  No Rigidity/Guarding/Rebound tenderness  LYMPHATIC:  No lymphadenopathy in Neck/Axillae/Groin  MUSCULOSKELETAL:  Gait and station normal, No Cyanosis/Clubbing/Edema, Capillary Refill<2 seconds  SKIN:  Warm, Dry, Inspection normal with no Rashes/Lesions/Ulcers  NEUROLOGIC:  Cranial  nerves II-XII grossly intact, Strength 5/5 bilaterally, Sensation grossly intact by touch, Reflexes 2+ bilaterally  PSYCHIATRIC:  The patient is well-nourished and well-groomed.  Alert & Oriented x 3.  Able to articulate well with normal speech/language, rate, volume and coherence.  Recent and remote memory intact.  The patient's mood and affect are described as anxious. Associations are intact.  Judgment and Insight are appropriate concerning matters relevant to self.     DATA:  None available    ASSESSMENT AND PLAN:      (F41.9) Anxiety  Comment: Stable. CPM. The indication, risks, benefits, potential side effects, and drug interactions of medication(s) were reviewed with the patient.  Alternative treatment options discussed and reviewed with the patient.  Medication instructions and consequences of not taking the medications were discussed with the patient.  Patient expressed understanding and he/she agreed to the plan.     (I10) Benign essential HTN  Comment: Stable. Continue amlodipine. Labs and repeat 5/2020. F/U 6 months. The indication, risks, benefits, potential side effects, and drug interactions of medication(s) were reviewed with the patient.  Alternative treatment options discussed and reviewed with the patient.  Medication instructions and consequences of not taking the medications were discussed with the patient.  Patient expressed understanding and he/she agreed to the plan. Limit sodium intake to 2400 mg per day.  Recommended a diet with a high intake of vegetables, fruits, and whole grains.  Advised at least 150 minutes of moderate to vigorous aerobic physical activity per week and weight train two to three times per week. If smoking- stop.  If consuming alcohol- alcohol consumption should be limited to no more than two drinks per day for men and one drink per day for women.  Measure BP at home at least a couple times per week and keep journal.     (Z72.0) Tobacco use  Plan: The patient and I  discussed his smoking at length.  Asked/assessed motivation for cessation. Advised patient to stop smoking, and offered counseling on smoking cessation to patient/family. Assessed need for any support programs/specific patient education and provided to patient/family if necessary.  The risks of smoking, including increased risks of emphysema/COPD, heart disease, sexual dysfunction, lung/bladder/oral/throat/pancreatic cancer, stroke, peptic ulcer disease, and pneumonia were discussed.  We also discussed various options for quitting, including cold turkey, Zyban, Chantix, and nicotine replacement therapy including nicotine patches, inhalers, lozenges, and gum.  Quit Line number was provided to the patient.  Counseled greater than 3 minutes total.     (N52.9) ED (erectile dysfunction) of organic origin  Plan: Stable. Viagra PRN. The indication, risks, benefits, potential side effects, and drug interactions of medication(s) were reviewed with the patient.  Alternative treatment options discussed and reviewed with the patient.  Medication instructions and consequences of not taking the medications were discussed with the patient.  Patient expressed understanding and he/she agreed to the plan.          Prateek Hooks MD    1.82

## 2021-08-21 NOTE — ED ADULT NURSE NOTE - OBJECTIVE STATEMENT
41 yr old male ambulatory to ED A&Ox4 presents with LUQ pain x 2weeks.  Pt reports pain right under rib area and increases after eating.  Denies any trauma to area.  No n/v.  NO diarrhea. No fevers or chills.  No acute resp distress noted. Resp even and unlabored. Abd soft, NT, ND. +BS. +PP. JUÁREZ. Skin warm, dry and intact. 20 G IV placed. Blood obtained and sent. Safety maintained. Will continue to monitor.

## 2021-08-21 NOTE — ED ADULT NURSE NOTE - NSSUHOSCREENINGYN_ED_ALL_ED
Verbal result taken from __Susy/Kami _______. PT/INR _30.3/2.5____ Date drawn__8/29____ continue dose, repeat INR in 4 weeks.    
No - the patient is unable to be screened due to medical condition

## 2021-08-21 NOTE — ED PROVIDER NOTE - ATTENDING CONTRIBUTION TO CARE
40 yo male, no pmh comes to the ED co intermittent left upper abdominal pain for 2 weeks,  worsening over the past few days, exacerbated with food.    Denies any fevers, chills, nausea, vomiting, diarrhea, constipation, c pain, sob or any other complaints.  check labs, covid, pepcid, re-eval    labs, cxr ok.  sxs imprvoed,   will dc on pepcid  , and clinic follow up,  Dr. Stephens:  I have reviewed and discussed with the PA/ resident the case specifics, including the history, physical assessment, evaluation, conclusion, laboratory results, and medical plan. I agree with the contents, and conclusions. I have personally examined, and interviewed the patient.

## 2021-08-21 NOTE — ED PROVIDER NOTE - CLINICAL SUMMARY MEDICAL DECISION MAKING FREE TEXT BOX
42 yo male, no pmh comes to the ED co intermittent left upper abdominal pain for 2 weeks,  worsening over the past few days, exacerbated with food.    Denies any fevers, chills, nausea, vomiting, diarrhea, constipation, c pain, sob or any other complaints.  minimal tenderness left upper abdoment, will start with labs, check cxr, covid swab and re-eval 40 yo male, no pmh comes to the ED co intermittent left upper abdominal pain for 2 weeks,  worsening over the past few days, exacerbated with food.    Denies any fevers, chills, nausea, vomiting, diarrhea, constipation, c pain, sob or any other complaints.  check labs, covid, pepcid, re-eval 40 yo male, no pmh comes to the ED co intermittent left upper abdominal pain for 2 weeks,  worsening over the past few days, exacerbated with food.    Denies any fevers, chills, nausea, vomiting, diarrhea, constipation, c pain, sob or any other complaints.  check labs, covid, pepcid, re-eval    labs, cxr ok.  sxs imprvoed,   will dc on pepcid  , and clinic follow up, as stephany attending seth

## 2021-08-21 NOTE — ED PROVIDER NOTE - NSFOLLOWUPINSTRUCTIONS_ED_ALL_ED_FT
Follow  up with our clinic, call to make an appointment this week. Your covid test is pending.   You can take the pepcid 20 mg 1 tab twice a day . if any fevers, chills, nausea, vomiting, any radiation of pain , worsening, concerning or new signs or symptoms return to the ER.

## 2021-08-21 NOTE — ED PROVIDER NOTE - OBJECTIVE STATEMENT
40 yo male, no pmh comes to the ED co intermittent left upper abdominal pain for 2 weeks,  worsening over the past few days, exacerbated with food.    Denies any fevers, chills, nausea, vomiting, diarrhea, constipation, c pain, sob or any other complaints.

## 2021-08-21 NOTE — ED PROVIDER NOTE - PATIENT PORTAL LINK FT
You can access the FollowMyHealth Patient Portal offered by [facility] by registering at the following website: [email or web address]. By joining [facility]’s FollowMyHealth portal, you will also be able to view your health information using other applications (apps) compatible with our system.

## 2021-11-01 LAB
ALBUMIN SERPL ELPH-MCNC: 4.7 G/DL
ALP BLD-CCNC: 92 U/L
ALT SERPL-CCNC: 22 U/L
ANION GAP SERPL CALC-SCNC: 12 MMOL/L
AST SERPL-CCNC: 20 U/L
BASOPHILS # BLD AUTO: 0.04 K/UL
BASOPHILS NFR BLD AUTO: 0.7 %
BILIRUB SERPL-MCNC: 0.2 MG/DL
BUN SERPL-MCNC: 16 MG/DL
CALCIUM SERPL-MCNC: 9.7 MG/DL
CHLORIDE SERPL-SCNC: 108 MMOL/L
CO2 SERPL-SCNC: 23 MMOL/L
CREAT SERPL-MCNC: 1.27 MG/DL
EOSINOPHIL # BLD AUTO: 0.33 K/UL
EOSINOPHIL NFR BLD AUTO: 5.4 %
GLUCOSE SERPL-MCNC: 73 MG/DL
HCT VFR BLD CALC: 44.4 %
HGB BLD-MCNC: 14.9 G/DL
IMM GRANULOCYTES NFR BLD AUTO: 0.2 %
LYMPHOCYTES # BLD AUTO: 2.26 K/UL
LYMPHOCYTES NFR BLD AUTO: 36.8 %
MAN DIFF?: NORMAL
MCHC RBC-ENTMCNC: 29.5 PG
MCHC RBC-ENTMCNC: 33.6 GM/DL
MCV RBC AUTO: 87.9 FL
MONOCYTES # BLD AUTO: 0.54 K/UL
MONOCYTES NFR BLD AUTO: 8.8 %
NEUTROPHILS # BLD AUTO: 2.96 K/UL
NEUTROPHILS NFR BLD AUTO: 48.1 %
PLATELET # BLD AUTO: 205 K/UL
POTASSIUM SERPL-SCNC: 4.2 MMOL/L
PROT SERPL-MCNC: 7 G/DL
RBC # BLD: 5.05 M/UL
RBC # FLD: 12.9 %
SODIUM SERPL-SCNC: 143 MMOL/L
VALPROATE FREE SERPL-MCNC: 6.2 MG/L
VALPROATE SERPL-MCNC: 52 UG/ML
WBC # FLD AUTO: 6.14 K/UL

## 2022-02-03 ENCOUNTER — OUTPATIENT (OUTPATIENT)
Dept: OUTPATIENT SERVICES | Facility: HOSPITAL | Age: 42
LOS: 1 days | End: 2022-02-03
Payer: MEDICAID

## 2022-02-03 ENCOUNTER — APPOINTMENT (OUTPATIENT)
Dept: NEUROLOGY | Facility: HOSPITAL | Age: 42
End: 2022-02-03

## 2022-02-03 DIAGNOSIS — R56.9 UNSPECIFIED CONVULSIONS: ICD-10-CM

## 2022-02-03 PROCEDURE — G0463: CPT

## 2022-02-04 DIAGNOSIS — G40.419 OTHER GENERALIZED EPILEPSY AND EPILEPTIC SYNDROMES, INTRACTABLE, WITHOUT STATUS EPILEPTICUS: ICD-10-CM

## 2022-02-04 NOTE — HISTORY OF PRESENT ILLNESS
[FreeTextEntry1] : 2/3/22\par Follow up visit conducted via telehealth using [employer]. History obtained from patient. Rocio has been seizure free for over two years. No abdominal pain or tremors. Recent labs in August 2021 demonstrated VPA level wnl and normal CBC and CMP. Patient happy with the management of his seizures but is asking whether there is a role for downtitrating his medications. \par \par 2/4/21: here for 6 month follow up. CBC/CMP/depakote level wnl. Patient has been seizure free for approx 2 years now. 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 He has no history of sleep deprivation triggering his seizures. He was advised to sleep during the day as much as he can, limit stress (which he is already doing), and if he feels unwell while driving, to pull over immediately, and he is and will remain compliant with his medications. \par \par 6/26/2020 Phone call follow up: Mr. Munoz reports that he has not had any seizures in over a year and is doing well on his current AED regimen. Got refills. No complaints at this time. Depakote changed to  mg BID\par \par 4/28/2020 Phone call follow up: Mr. Munoz reports that he has been doing well and has not had any seizures. Got refills. 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 Last depakote level is from 5/2019 which was therapeutic (66)\par \par The semiology of the seizure is poorly described, usually happen in the am with a prodrome of feeling slow and confused and nonspecific dizziness, followed by generalized shaking upper and lower extremities for 1-2 minutes, eyes open no deviation . Short postictal period (10 min) \par poor sleep habits due to stress.\par \par He is currently taking VPA 500mg XR BID and Zonegran 300 mg qhs. He has not had any seizures in over a year. Was feeling very anxious in the mornings when he was starting VPA but now is feeling back to normal. He believes he had increased seizure frequency before due to stress of moving in with his girlfriend and her daughter and less sleep. He does not drive.\par Previous meds: Keppra\par

## 2022-02-04 NOTE — DISCUSSION/SUMMARY
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 Tapered off keppra due to anxiety and currently on VPA and zonisamide, doing well with no breakthrough seizures or adverse effects. Labs (CBC/ CMP/ depakote level) from 8/2020 wnl. During his last visit in 8/2020 he was cleared to drive given that he has been seizure free for >2 years. \par \par Patient states he does not have side effects from the medications. \par \par PLAN:\par 1. Continue zonisamide 300 mg qhs\par 2. Continue depakote 500 XR BID\par 3. follow up in 3 months, earlier if need be\par 4. medications renewed today\par 5. Ambulatory EEG for 48 hours; If reassuring, will consider downtitrating medications, maybe down on the VPA\par

## 2022-04-07 ENCOUNTER — APPOINTMENT (OUTPATIENT)
Dept: NEUROLOGY | Facility: HOSPITAL | Age: 42
End: 2022-04-07

## 2022-04-14 NOTE — DISCHARGE NOTE ADULT - CLICK TO LAUNCH ORM
. Bilobed Transposition Flap Text: The defect edges were debeveled with a #15 scalpel blade.  Given the location of the defect and the proximity to free margins a bilobed transposition flap was deemed most appropriate.  Using a sterile surgical marker, an appropriate bilobe flap drawn around the defect.    The area thus outlined was incised deep to adipose tissue with a #15 scalpel blade.  The skin margins were undermined to an appropriate distance in all directions utilizing iris scissors.

## 2022-05-09 ENCOUNTER — APPOINTMENT (OUTPATIENT)
Dept: NEUROLOGY | Facility: CLINIC | Age: 42
End: 2022-05-09

## 2022-05-27 ENCOUNTER — APPOINTMENT (OUTPATIENT)
Dept: NEUROLOGY | Facility: CLINIC | Age: 42
End: 2022-05-27

## 2022-07-08 ENCOUNTER — APPOINTMENT (OUTPATIENT)
Dept: NEUROLOGY | Facility: CLINIC | Age: 42
End: 2022-07-08

## 2022-07-08 PROCEDURE — 95816 EEG AWAKE AND DROWSY: CPT

## 2022-07-09 PROCEDURE — 95708 EEG WO VID EA 12-26HR UNMNTR: CPT

## 2022-07-10 PROCEDURE — 95721 EEG PHY/QHP>36<60 HR W/O VID: CPT

## 2022-07-10 PROCEDURE — 95700 EEG CONT REC W/VID EEG TECH: CPT

## 2022-07-10 PROCEDURE — 95708 EEG WO VID EA 12-26HR UNMNTR: CPT

## 2022-08-09 ENCOUNTER — NON-APPOINTMENT (OUTPATIENT)
Age: 42
End: 2022-08-09

## 2022-08-12 NOTE — ED PROVIDER NOTE - NS_EDPROVIDERDISPOUSERTYPE_ED_A_ED
Anesthesia Pre Eval Note    OB Evaluation    Anesthesia ROS/Med Hx        Anesthetic Complication History:  Patient does not have a history of anesthetic complications      Pulmonary Review:  Patient does not have a pulmonary history      Neuro/Psych Review:  Patient does not have a neuro/psych history       Cardiovascular Review:  Patient does not have a cardiovascular history       GI/HEPATIC/RENAL Review:  Patient does not have a GI/hepatic/renalhistory       End/Other Review:  Patient does not have an endo/other history        Relevant Problems   No relevant active problems       Physical Exam     Airway   Mallampati: II  TM Distance: >3 FB  Neck ROM: Full    Cardiovascular    Cardio Rhythm: Regular  Cardio Rate: Normal    Head Assessment  Head assessment: Atraumatic and Normocephalic    General Assessment  General Assessment: Alert and oriented and Moderate distress    Dental Exam  Dental exam normal    Abdominal Exam    Patient Demonstrates:  Gravid      Anesthesia Plan:    ASA Status: 2  Anesthesia Type: L&D Epidural    Checklist  Reviewed: Lab Results, Patient Summary, Allergies, Past Med History, Medications, Problem list and NPO Status  Consent/Risks Discussed Statement:  The proposed anesthetic plan, including its risks and benefits, have been discussed with the Patient along with the risks and benefits of alternatives. Questions were encouraged and answered and the patient and/or representative understands and agrees to proceed.        I discussed with the patient (and/or patient's legal representative) the risks and benefits of the proposed anesthesia plan, L&D Epidural, which may include services performed by other anesthesia providers.    Alternative anesthesia plans, if available, were reviewed with the patient (and/or patient's legal representative). Discussion has been held with the patient (and/or patient's legal representative) regarding risks of anesthesia, which include Nerve Injury, Nausea,  Headache, Sore Throat, Dental Injury, Aspiration, Intra-operative Awareness and Vomiting and emergent situations that may require change in anesthesia plan.    The patient (and/or patient's legal representative) has indicated understanding, his/her questions have been answered, and he/she wishes to proceed with the planned anesthetic.    Blood Products: Not Anticipated    Comments  Plan Comments: Risks of bleeding, nerve damage, postdural puncture headache, technique failure, need for general anesthetic, and infection reviewed with pt.  Pt agrees to risks and wishes to proceed with epidural placement. Consent signed.       Attending Attestation (For Attendings USE Only)...

## 2022-12-12 NOTE — ED ADULT TRIAGE NOTE - NS ED NURSE AMBULANCES
Received refill request for    Requested Prescriptions     Name from pharmacy: DOXAZOSIN MESYLATE 4 MG TAB         Will file in chart as: doxazosin (CARDURA) 4 MG tablet    Sig: TAKE 1 TABLET BY MOUTH EVERY DAY    Disp:  90 tablet    Refills:  3    Start: 12/12/2022    Class: Eprescribe    Non-formulary    Last ordered: 9 months ago by Taiwo Parra MD Last refill: 11/22/2022    Rx #: [id number]       To be filled at: [facility]/pharmacy #[id number] - [place], [place] - [address] [place] [address]          Last office visit: Taiwo Parra MD 7/28/22  FUV scheduled: Taiwo Parra MD 2/2/23    Lab Results   Component Value Date    SODIUM 142 07/21/2022    POTASSIUM 4.4 07/21/2022    GLUCOSE 107 (H) 07/21/2022    CALCIUM 9.4 07/21/2022    CO2 28 07/21/2022    CHLORIDE 104 07/21/2022    BUN 15 07/21/2022    CREATININE 1.16 07/21/2022    TOTPROTEIN 6.5 06/29/2022    ALBUMIN 3.7 06/29/2022    BILIRUBIN 0.9 06/29/2022    AST 39 (H) 06/29/2022    ALKPT 73 06/29/2022    ANIONGAP 14 07/21/2022    BCRAT 13 07/21/2022    AGR 1.2 06/08/2011    GFRNA 67 02/05/2019    GFRA 78 02/05/2019       Refilled at this time, per MD note 7/28/22  Assessment and Plan:  1.  Hypertension will continue lisinopril hydrochlorothiazide amlodipine doxazosin        
[employer]

## 2023-04-07 ENCOUNTER — APPOINTMENT (OUTPATIENT)
Dept: INTERNAL MEDICINE | Facility: CLINIC | Age: 43
End: 2023-04-07
Payer: MEDICAID

## 2023-04-07 ENCOUNTER — NON-APPOINTMENT (OUTPATIENT)
Age: 43
End: 2023-04-07

## 2023-04-07 VITALS
WEIGHT: 187 LBS | BODY MASS INDEX: 31.16 KG/M2 | RESPIRATION RATE: 14 BRPM | OXYGEN SATURATION: 98 % | HEIGHT: 65 IN | SYSTOLIC BLOOD PRESSURE: 110 MMHG | HEART RATE: 75 BPM | TEMPERATURE: 97.5 F | DIASTOLIC BLOOD PRESSURE: 80 MMHG

## 2023-04-07 PROCEDURE — 99204 OFFICE O/P NEW MOD 45 MIN: CPT

## 2023-04-07 NOTE — HEALTH RISK ASSESSMENT
[Very Good] : ~his/her~  mood as very good [No] : No [No falls in past year] : Patient reported no falls in the past year [de-identified] : not exercising [de-identified] : no bient

## 2023-04-07 NOTE — HISTORY OF PRESENT ILLNESS
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

## 2023-04-13 LAB
25(OH)D3 SERPL-MCNC: 24.8 NG/ML
ALBUMIN SERPL ELPH-MCNC: 4.2 G/DL
ALP BLD-CCNC: 85 U/L
ALT SERPL-CCNC: 48 U/L
ANION GAP SERPL CALC-SCNC: 10 MMOL/L
APPEARANCE: CLEAR
AST SERPL-CCNC: 30 U/L
BACTERIA: NEGATIVE
BASOPHILS # BLD AUTO: 0.05 K/UL
BASOPHILS NFR BLD AUTO: 0.8 %
BILIRUB SERPL-MCNC: 0.2 MG/DL
BILIRUBIN URINE: NEGATIVE
BLOOD URINE: NEGATIVE
BUN SERPL-MCNC: 10 MG/DL
CALCIUM SERPL-MCNC: 9.3 MG/DL
CHLORIDE SERPL-SCNC: 108 MMOL/L
CHOLEST SERPL-MCNC: 235 MG/DL
CO2 SERPL-SCNC: 22 MMOL/L
COLOR: NORMAL
CREAT SERPL-MCNC: 1.15 MG/DL
EGFR: 83 ML/MIN/1.73M2
EOSINOPHIL # BLD AUTO: 0.51 K/UL
EOSINOPHIL NFR BLD AUTO: 7.8 %
ESTIMATED AVERAGE GLUCOSE: 128 MG/DL
GLUCOSE QUALITATIVE U: NEGATIVE
GLUCOSE SERPL-MCNC: 100 MG/DL
HBA1C MFR BLD HPLC: 6.1 %
HCT VFR BLD CALC: 45.2 %
HDLC SERPL-MCNC: 28 MG/DL
HGB BLD-MCNC: 15.5 G/DL
HYALINE CASTS: 0 /LPF
IMM GRANULOCYTES NFR BLD AUTO: 0.3 %
KETONES URINE: NEGATIVE
LDLC SERPL CALC-MCNC: 147 MG/DL
LEUKOCYTE ESTERASE URINE: NEGATIVE
LYMPHOCYTES # BLD AUTO: 2.46 K/UL
LYMPHOCYTES NFR BLD AUTO: 37.4 %
MAN DIFF?: NORMAL
MCHC RBC-ENTMCNC: 29.9 PG
MCHC RBC-ENTMCNC: 34.3 GM/DL
MCV RBC AUTO: 87.1 FL
MICROSCOPIC-UA: NORMAL
MONOCYTES # BLD AUTO: 0.84 K/UL
MONOCYTES NFR BLD AUTO: 12.8 %
NEUTROPHILS # BLD AUTO: 2.7 K/UL
NEUTROPHILS NFR BLD AUTO: 40.9 %
NITRITE URINE: NEGATIVE
NONHDLC SERPL-MCNC: 207 MG/DL
PH URINE: 6
PLATELET # BLD AUTO: 227 K/UL
POTASSIUM SERPL-SCNC: 4.8 MMOL/L
PROT SERPL-MCNC: 7 G/DL
PROTEIN URINE: NEGATIVE
PSA SERPL-MCNC: 0.89 NG/ML
RBC # BLD: 5.19 M/UL
RBC # FLD: 12.5 %
RED BLOOD CELLS URINE: 1 /HPF
SODIUM SERPL-SCNC: 141 MMOL/L
SPECIFIC GRAVITY URINE: 1.01
SQUAMOUS EPITHELIAL CELLS: 0 /HPF
TRIGL SERPL-MCNC: 303 MG/DL
TSH SERPL-ACNC: 4.41 UIU/ML
UROBILINOGEN URINE: NORMAL
WBC # FLD AUTO: 6.58 K/UL
WHITE BLOOD CELLS URINE: 0 /HPF

## 2023-07-28 NOTE — ED PROVIDER NOTE - CROS ED CONS ALL NEG
6/16/2023 Patient is a 68 year old  male who presents for stomach issues and acid reflux. Patient states that he went to see his ENT for sinus issues and states that they told him that he might have acid reflux. Patient denies abdominal pain, heartburn, indigestion, dysphagia, melena, or hematochezia. He states that he only has a lot of gas. He denies any heart, lung, or kidney issues.  7/28/2023 Patient is here for a follow up. Patient states that he has not had a lot of symptoms. ENT provider noticed the inflammation. Brenda was being seen by ENT because of sinus issues. Patient is not experiencing any symptoms of heartburn. He is doing well. He has not taken the carafate because of the risk of increasing his pre-existing insomnia. Patient has been experiencing a metallic taste in his mouth, and it appeared when he began pantoprazole. He is scheduled for a colonoscopy in a year.
negative...

## 2023-09-23 ENCOUNTER — EMERGENCY (EMERGENCY)
Facility: HOSPITAL | Age: 43
LOS: 1 days | Discharge: HOME | End: 2023-09-23
Attending: STUDENT IN AN ORGANIZED HEALTH CARE EDUCATION/TRAINING PROGRAM | Admitting: STUDENT IN AN ORGANIZED HEALTH CARE EDUCATION/TRAINING PROGRAM
Payer: MEDICAID

## 2023-09-23 VITALS
SYSTOLIC BLOOD PRESSURE: 133 MMHG | OXYGEN SATURATION: 95 % | HEIGHT: 65 IN | TEMPERATURE: 98 F | DIASTOLIC BLOOD PRESSURE: 88 MMHG | HEART RATE: 82 BPM | WEIGHT: 175.05 LBS | RESPIRATION RATE: 18 BRPM

## 2023-09-23 PROCEDURE — 99283 EMERGENCY DEPT VISIT LOW MDM: CPT | Mod: 25

## 2023-09-23 PROCEDURE — 99283 EMERGENCY DEPT VISIT LOW MDM: CPT

## 2023-09-23 RX ORDER — CIPROFLOXACIN AND DEXAMETHASONE 3; 1 MG/ML; MG/ML
4 SUSPENSION/ DROPS AURICULAR (OTIC)
Qty: 2 | Refills: 0
Start: 2023-09-23 | End: 2023-09-29

## 2023-09-23 RX ORDER — LEVETIRACETAM 250 MG/1
1500 TABLET, FILM COATED ORAL
Qty: 0 | Refills: 0 | DISCHARGE

## 2023-09-23 RX ORDER — ZONISAMIDE 100 MG
2 CAPSULE ORAL
Qty: 0 | Refills: 0 | DISCHARGE

## 2023-09-23 NOTE — ED ADULT TRIAGE NOTE - CHIEF COMPLAINT QUOTE
c/o right ear pain that started on Tuesday. Pt. drives long distances and was unable to go to seek medical treatment. Pt. used OTC ear pain medication with no relief.

## 2023-09-23 NOTE — ED ADULT TRIAGE NOTE - BP NONINVASIVE DIASTOLIC (MM HG)
Impression: Tear film insufficiency of bilateral lacrimal glands Plan: Recommend AT's QID or more 88

## 2023-09-23 NOTE — ED PROVIDER NOTE - OBJECTIVE STATEMENT
43-year-old male presented to the ED with right ear pain times several days, patient works as a  and wears a headset during work, patient complaining of external right ear pain Hirsch denies any drainage denies any fever chills denies any other complaints.

## 2023-09-23 NOTE — ED ADULT NURSE NOTE - OBJECTIVE STATEMENT
Presents to ED from home c/o right ear pain that started on Tuesday. Pt. drives long distances and was unable to go to seek medical treatment. Pt. used OTC ear pain medication with no relief.

## 2023-09-23 NOTE — ED PROVIDER NOTE - PHYSICAL EXAMINATION
VITAL SIGNS: I have reviewed nursing notes and confirm.  CONSTITUTIONAL: well-appearing, non-toxic, NAD  SKIN: Warm dry, normal skin turgor  HEAD: NCAT  EYES: EOMI, PERRLA, no scleral icterus  ENT: Moist mucous membranes, normal pharynx with no erythema or exudates, R ear canal swelling, erythema  NECK: Supple; non tender. Full ROM. No cervical LAD

## 2023-09-23 NOTE — ED PROVIDER NOTE - PATIENT PORTAL LINK FT
You can access the FollowMyHealth Patient Portal offered by [facility] by registering at the following website: [email or web address]. By joining [employer]’s FollowMyHealth portal, you will also be able to view your health information using other applications (apps) compatible with our system.

## 2023-10-02 NOTE — ED ADULT NURSE NOTE - CINV DISCH TEACH PARTICIP
Online Parenting Resources    Everyday Parenting: The ABCs of Child Rearing  (FREE)  https://www.coursera.org/learn/everyday-parenting  Description:  Everyday Parenting gives you access to a toolkit of behavior-change techniques that will make your typical day in the home easier as you develop the behaviors you would like to see in your child. The lessons provide step-by-step instructions and demonstrations to improve your course of action with both children and adolescents. Among many techniques, you will learn how even simple modifications to tone of voice and phrasing can lead to more compliance. The course will also shed light on many parenting misconceptions and ineffective strategies that are routinely used.  21 Hours,  into Modules            Triple P ($79.95 for online program)  https://[email or web address].Tiipz.com/us/triple-p/  Description:  A toolbox of ideas for positive parenting.  Able to choose the strategies you need when you want them.  Divided into Triple P Online for toddlers to tweens (0 -12 years), and Teen Triple P Online for parents of pre-teens and teens (10 - 16 years)  8 modules, each are one hour with workbook, reward chart resources    
Patient

## 2024-01-01 NOTE — ED ADULT NURSE NOTE - CAS EDN DISCHARGE ASSESSMENT
COMMENTS:  History of large PDA per echocardiogram at referral. S/P Indomethacin x3 with last treatment on  3/14. Persistent large PDA and respiratory  support. Transferred  for Cardiology consult and PDA occlusion. Echocardiogram completed on admission. Peds Cardiology consulted. CXR with large cardiac silhouette. Loud murmur auscultated on exam.     PLANS:  - PDA occlusion scheduled for 3/27 @0800  - CBC ordered for am in preparation for procedure as well as  type and screen  - Follow with Peds Cardiology    Alert and oriented to person, place and time

## 2024-02-25 ENCOUNTER — NON-APPOINTMENT (OUTPATIENT)
Age: 44
End: 2024-02-25

## 2024-03-29 ENCOUNTER — RX RENEWAL (OUTPATIENT)
Age: 44
End: 2024-03-29

## 2024-04-05 ENCOUNTER — RX RENEWAL (OUTPATIENT)
Age: 44
End: 2024-04-05

## 2024-04-10 ENCOUNTER — EMERGENCY (EMERGENCY)
Facility: HOSPITAL | Age: 44
LOS: 1 days | Discharge: HOME | End: 2024-04-10
Attending: EMERGENCY MEDICINE | Admitting: EMERGENCY MEDICINE
Payer: MEDICAID

## 2024-04-10 VITALS
SYSTOLIC BLOOD PRESSURE: 145 MMHG | OXYGEN SATURATION: 98 % | TEMPERATURE: 98 F | DIASTOLIC BLOOD PRESSURE: 99 MMHG | WEIGHT: 198.42 LBS | HEIGHT: 65 IN | HEART RATE: 98 BPM | RESPIRATION RATE: 18 BRPM

## 2024-04-10 VITALS
HEART RATE: 85 BPM | RESPIRATION RATE: 16 BRPM | DIASTOLIC BLOOD PRESSURE: 74 MMHG | SYSTOLIC BLOOD PRESSURE: 136 MMHG | OXYGEN SATURATION: 98 %

## 2024-04-10 PROCEDURE — 99283 EMERGENCY DEPT VISIT LOW MDM: CPT

## 2024-04-10 PROCEDURE — 99284 EMERGENCY DEPT VISIT MOD MDM: CPT | Mod: 25

## 2024-04-10 RX ADMIN — Medication 30 MILLILITER(S): at 06:43

## 2024-04-10 RX ADMIN — Medication 1 MILLIGRAM(S): at 06:43

## 2024-04-10 NOTE — ED PROVIDER NOTE - CLINICAL SUMMARY MEDICAL DECISION MAKING FREE TEXT BOX
This.43-year-old male past medical history of seizures presenting with concerns this is not typical of an aura for seizure and he is not concerned for that.  He states that he has been stressed recently and is not consuming alcohol to help him sleep.  He stopped drinking as much last night because he wanted to stop drinking in general and it prevented him from getting a good night sleep.  I do not believe patient has any acute psychiatric indication that would require involuntary or voluntary hospitalization to a psychiatric facility.  The patient has no interest in this as well.  He does not appear to be a safety risk after discussion with his wife.  I will treat the patient symptomatically here with a benzodiazepine which will help with any type of alcohol withdrawal he may be experiencing as well as his overall symptoms of nervousness.  If he feels better I will refer him to a psychiatrist who can help with long-term anxiety management.

## 2024-04-10 NOTE — ED PROVIDER NOTE - NSFOLLOWUPINSTRUCTIONS_ED_ALL_ED_FT
English    Alcohol Misuse and Dependence Information, Adult  Alcohol is a widely available drug and people choose to drink alcohol in different amounts. Alcohol misuse and dependence can have a negative effect on your life. Alcohol misuse is when you use alcohol too much or too often. You may have a hard time setting a limit on the amount you drink.    Alcohol dependence is when you use alcohol consistently for a period of time, and your body changes as a result. Alcohol dependence can make it hard for you to stop drinking because you may start to feel sick or different when you do not drink alcohol. These symptoms are known as withdrawal.    People who drink alcohol very often and in large amounts, may develop what is called an alcohol use disorder.    How can alcohol misuse and dependence affect me?  Drinking too much can lead to addiction. You may feel like you need alcohol to function normally. You may drink alcohol before work in the morning, during the day, or as soon as you get home from work in the evening. These actions can result in:  Poor work performance.  Job loss.  Financial problems.  Car crashes or criminal charges from driving after drinking alcohol.  Problems in your relationships with friends and family.  Losing the trust and respect of coworkers, friends, and family.  Drinking heavily over a long period of time can permanently damage your body and brain, and can cause lifelong health issues, such as:  Damage to your liver or pancreas.  Heart problems, high blood pressure, or stroke.  Certain cancers.  Decreased ability to fight infections.  Brain or nerve damage.  Depression.  Early death, also called premature death.  If you are careless or you crave alcohol, it is easy to drink more than your body can handle (overdose). Alcohol overdose is a serious situation that requires hospitalization. It may lead to permanent injuries or death.    What can increase my risk?  Having a family history of alcohol misuse.  Having depression or other mental health conditions.  Beginning to drink at an early age.  Binge drinking often.  Experiencing trauma, stress, and an unstable home life during childhood.  Spending time with people who drink often.  What actions can I take to prevent alcohol misuse and dependence?  Do not drink alcohol if:  Your health care provider tells you not to drink.  You are pregnant, may be pregnant, or are planning to become pregnant.  If you drink alcohol:  Limit how much you have to:  0–1 drink a day for women who are not pregnant.  0–2 drinks a day for men.  Know how much alcohol is in your drink. In the [place] one drink equals one 12 oz bottle of beer (355 mL), one 5 oz glass of wine (148 mL), or one 1½ oz glass of hard liquor (44 mL).  If you think you have an alcohol dependency problem, decide to stop drinking. This can be very hard to do if you are used to frequently drinking alcohol.  If you begin to have withdrawal symptoms, talk with your health care provider or a person that you trust. These symptoms may include anxiety, shaky hands, headache, nausea, sweating, or not being able to sleep.  Choose to drink nonalcoholic beverages in social gatherings and places where there may be alcohol.  Activity    Spend more time on activities that you enjoy that do not involve alcohol, like hobbies or exercise.  Find healthy ways to cope with stress, such as meditation or spending time with people you care about.  General information    Talk to your family, coworkers, and friends about supporting you in your efforts to stop drinking. If they drink, ask them not to drink around you. Spend more time with people who do not drink alcohol.  If you think that you have an alcohol dependency problem:  Tell friends or family about your concerns.  Talk with your health care provider or another health professional about where to get help.  Work with a therapist and a chemical dependency counselor.  Consider joining a support group for people who struggle with alcohol misuse and dependence.  Where to find support  A group of people participating in a therapy session.  Your health care provider.  Tourvia.me: smartrecovery.org  Local treatment centers or chemical dependency counselors.  Local AA groups in your community: aa.org  Where to find more information  Centers for Disease Control and Prevention: cdc.gov  National Edwards on Alcohol Abuse and Alcoholism: niaaa.nih.gov  Alcoholics Anonymous (AA): aa.org  Contact a health care provider if:  You drank more or for longer than you intended on more than one occasion.  You often drink to the point of vomiting or passing out.  You have problems in your life due to drinking, but you continue to drink.  You keep drinking even though you feel anxious, depressed, or have experienced memory loss.  You have stopped doing the things you used to enjoy in order to drink.  You have to drink more than you used to in order to get the effect you want.  You experience anxiety, sweating, nausea, shakiness, and trouble sleeping when you try to stop drinking.  Get help right away if:  You have serious withdrawal symptoms, including:  Confusion.  Racing heart.  High blood pressure.  Fever.  These symptoms may be an emergency. Get help right away. Call 911.  Do not wait to see if the symptoms will go away.  Do not drive yourself to the hospital.  Also, get help right away if:  You have thoughts about hurting yourself or others.  Take one of these steps if you feel like you may hurt yourself or others, or have thoughts about taking your own life:  Call 911.  Call the National Suicide Prevention Lifeline at 1-894.385.5012 or 938. This is open 24 hours a day.  Text the Crisis Text Line at 277440.  Summary  Alcohol misuse and dependence can have a negative effect on your life. Drinking too much or too often can lead to addiction.  If you drink alcohol, limit how much you use.  If you are having trouble keeping your drinking under control, find ways to change your behavior. Hobbies, calming activities, exercise, or support groups can help.  If you feel you need help with changing your drinking habits, talk with your health care provider, a good friend, or a therapist, or go to a support group.  This information is not intended to replace advice given to you by your health care provider. Make sure you discuss any questions you have with your health care provider.    Document Revised: 02/22/2023 Document Reviewed: 02/22/2023  [employer] Patient Education © 2024 [employer]  [employer] logo  Terms and Conditions  Privacy Policy  Editorial Policy  All content on this site: Copyright © 2024 Elsevier, its licensors, and contributors. All rights are reserved, including those for text and data mining, AI training, and similar technologies. For all open access content, the Creative Commons licensing terms apply.  Cookies are used by this site. To decline or learn more, visit our Cookies page.  [employer]

## 2024-04-10 NOTE — ED PROVIDER NOTE - PROGRESS NOTE DETAILS
At the time of discharge the patient is at baseline physical, mental and behavioral state.  I instructed the patient to return to the emergency department with any alarming symptoms, any worsening symptoms, or any other concerning symptoms.  I instructed this patient to call their primary doctor today, to inform them of their visit to the emergency department, and to obtain a repeat evaluation from their primary doctor in the next 24 hours.  This patient understands and agrees with this plan for follow up and feels safe returning home.  At the time of discharge this patient remained in stable condition, remained in no acute distress, and their vital signs remained stable measured within normal limits.  Case discussed with navigator and patient to be set up with outpatient services for detox.No active evidence of withdrawal, patient resting comfortably to be discharged with his wife to go home and follow-up as outpatient.  The patient has a PCP named Dr. John Reyes.

## 2024-04-10 NOTE — ED ADULT NURSE NOTE - NSFALLUNIVINTERV_ED_ALL_ED
Bed/Stretcher in lowest position, wheels locked, appropriate side rails in place/Call bell, personal items and telephone in reach/Instruct patient to call for assistance before getting out of bed/chair/stretcher/Non-slip footwear applied when patient is off stretcher/[place] to call system/Physically safe environment - no spills, clutter or unnecessary equipment/Purposeful proactive rounding/Room/bathroom lighting operational, light cord in reach

## 2024-04-10 NOTE — CHART NOTE - NSCHARTNOTEFT_GEN_A_CORE
43 y o male presenting to the ER on 4/10/24 complaining of abdominal pain.  SW consult was placed to provide resources for alcohol abuse and mental health counseling.  SW called via  [facility] # [id number] to discussed with patient mental health and substance abuse programs agreeable to the patient.  The call went to voicemail and contact information was provided.

## 2024-04-10 NOTE — ED PROVIDER NOTE - PATIENT PORTAL LINK FT
You can access the FollowMyHealth Patient Portal offered by [facility] by registering at the following website: [email or web address]. By joining [employer]’s FollowMyHealth portal, you will also be able to view your health information using other applications (apps) compatible with our system.

## 2024-04-10 NOTE — ED PROVIDER NOTE - OBJECTIVE STATEMENT
43-year-old male past medical history of seizures controlled with medication last seizure approximately 5 years ago presenting to the emergency department today with feeling very nervous.  Patient states that he got very emotional recently and to help has been consuming large amounts of alcohol Sunday and Monday.  He only drank a little bit on Tuesday and has been unable to sleep secondary to feeling anxious.  Patient does not feel as if he is going to have a seizure and does not drink regularly.  He says that he is more stressed than usual.  He denies any SI HI  or .  Is requesting medication to help with his symptoms.  Patient endorses 1 episode of nausea with nonbilious nonbloody emesis last night.  He is experiencing some mild epigastric burning    I have collateral from his wife in the room that she has no concerns for his safety.  This has happened before when he has anxiety overcome him.

## 2024-04-10 NOTE — ED ADULT NURSE NOTE - NSSUHOSCREENINGYN_ED_ALL_ED
Called several times no answers.      Chelsea Mcnair RN  07/25/23 1757
Yes - the patient is able to be screened

## 2024-04-10 NOTE — ED PROVIDER NOTE - PHYSICAL EXAMINATION
Vitals: I have reviewed the patients vital signs  General: nontoxic appearing  HEENT: Atraumatic, normocephalic, airway patent  Eyes: EOMI, tracking appropriately  Neck: no tracheal deviation  Chest/Lungs: no trauma, symmetric chest rise, speaking in complete sentences,  no resp distress  Heart: skin and extremities well perfused, regular rate and rhythm  Neuro: A+Ox3, appears non focal  MSK: no deformities  Skin: no cyanosis, no jaundice   Psych: Appears anxious and affect no SI HI AH VH

## 2024-04-10 NOTE — ED ADULT NURSE NOTE - OBJECTIVE STATEMENT
Pt comes into the ED stating "I've been drinking for the past three days and I am having difficulty sleeping. I feel stressed and nervous and it is keeping me up". pt denies sob, fevers SI/HI. pt states that he vomited multiple times yesterday and is now having epigastric pain. pt has a history of seizures, last seizure 5yrs ago.

## 2024-04-10 NOTE — ED ADULT TRIAGE NOTE - CHIEF COMPLAINT QUOTE
H ehas epigastric pain and vomited couple of times. He was drinking for 2 days, but he is really not a drinker" as per wife

## 2024-05-10 ENCOUNTER — APPOINTMENT (OUTPATIENT)
Dept: INTERNAL MEDICINE | Facility: CLINIC | Age: 44
End: 2024-05-10
Payer: MEDICAID

## 2024-05-10 VITALS
DIASTOLIC BLOOD PRESSURE: 80 MMHG | HEIGHT: 65.5 IN | OXYGEN SATURATION: 98 % | SYSTOLIC BLOOD PRESSURE: 122 MMHG | TEMPERATURE: 97.1 F | HEART RATE: 84 BPM | WEIGHT: 198 LBS | RESPIRATION RATE: 15 BRPM | BODY MASS INDEX: 32.59 KG/M2

## 2024-05-10 DIAGNOSIS — Z00.00 ENCOUNTER FOR GENERAL ADULT MEDICAL EXAMINATION W/OUT ABNORMAL FINDINGS: ICD-10-CM

## 2024-05-10 DIAGNOSIS — G40.309 GENERALIZED IDIOPATHIC EPILEPSY AND EPILEPTIC SYNDROMES, NOT INTRACTABLE, W/OUT STATUS EPILEPTICUS: ICD-10-CM

## 2024-05-10 PROCEDURE — 99214 OFFICE O/P EST MOD 30 MIN: CPT

## 2024-05-10 NOTE — HISTORY OF PRESENT ILLNESS
[FreeTextEntry1] : ED Follow up [de-identified] : ED  follow up worked hard a whole week 2AM took shower and went to sleep and GF said he was shaking , woke up after less than minute whole body per GF went to ER told to follow up  had seizures in past 4 years ago, but did have drinking 2 weeks prior

## 2024-05-17 ENCOUNTER — APPOINTMENT (OUTPATIENT)
Dept: NEUROLOGY | Facility: CLINIC | Age: 44
End: 2024-05-17

## 2024-06-14 ENCOUNTER — APPOINTMENT (OUTPATIENT)
Dept: NEUROLOGY | Facility: CLINIC | Age: 44
End: 2024-06-14
Payer: MEDICAID

## 2024-06-14 VITALS
DIASTOLIC BLOOD PRESSURE: 86 MMHG | HEART RATE: 80 BPM | SYSTOLIC BLOOD PRESSURE: 121 MMHG | WEIGHT: 194 LBS | HEIGHT: 65.5 IN | BODY MASS INDEX: 31.93 KG/M2

## 2024-06-14 DIAGNOSIS — G40.419 OTHER GENERALIZED EPILEPSY AND EPILEPTIC SYNDROMES, INTRACTABLE, W/OUT STATUS EPILEPTICUS: ICD-10-CM

## 2024-06-14 PROCEDURE — 99204 OFFICE O/P NEW MOD 45 MIN: CPT

## 2024-06-14 RX ORDER — DIVALPROEX SODIUM 500 1/1
500 TABLET, EXTENDED RELEASE ORAL
Qty: 180 | Refills: 3 | Status: ACTIVE | COMMUNITY
Start: 2019-06-06 | End: 1900-01-01

## 2024-06-14 RX ORDER — ZONISAMIDE 100 MG/1
100 CAPSULE ORAL
Qty: 270 | Refills: 3 | Status: ACTIVE | COMMUNITY
Start: 2018-09-17 | End: 1900-01-01

## 2024-06-19 PROBLEM — G40.419 INTRACTABLE GENERALIZED TONIC-CLONIC EPILEPSY: Status: ACTIVE | Noted: 2019-04-04

## 2024-06-19 NOTE — DISCUSSION/SUMMARY
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 Tapered off keppra due to anxiety and currently on VPA and zonisamide, doing well with no breakthrough seizures or adverse effects. Labs (CBC/ CMP/ depakote level) from 8/2020 wnl. During his last visit in 8/2020 he was cleared to drive given that he has been seizure free for >2 years. Discussed as above regarding taking night time shifts at his new job, patient understands risks and will continue to take his medications, and will follow up in 6 months. \par  \par  PLAN:\par  1. Continue zonisamide 300 mg qhs\par  2. Continue depakote 500 XR BID\par  3. follow up in 6 months, earlier if need be\par  4. medications renewed today\par

## 2024-06-19 NOTE — HISTORY OF PRESENT ILLNESS
[FreeTextEntry1] : *** 06/14/2024 ***  PHIL GAMBLE is here for initial eval. after being seizure free for 4 years had a seizure in the context of binge drinking. he is back on vpa/zonisamide. has SE from VPA (weight gain)   2/4/21: here for 6 month follow up. CBC/CMP/depakote level wnl. Patient has been seizure free for approx 2 years now. 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 He has no history of sleep deprivation triggering his seizures. He was advised to sleep during the day as much as he can, limit stress (which he is already doing), and if he feels unwell while driving, to pull over immediately, and he is and will remain compliant with his medications.   6/26/2020 Phone call follow up: Mr. Gamble reports that he has not had any seizures in over a year and is doing well on his current AED regimen. Got refills. No complaints at this time. Depakote changed to  mg BID  4/28/2020 Phone call follow up: Mr. Gamble reports that he has been doing well and has not had any seizures. Got refills. Denies any side effects.  Interval Hx: 38 yo man with history of idiopathic generalized epilepsy with generalized spike wave activity on EEG 2019 presenting for follow up. Last depakote level is from 5/2019 which was therapeutic (66)  The semiology of the seizure is poorly described, usually happen in the am with a prodrome of feeling slow and confused and nonspecific dizziness, followed by generalized shaking upper and lower extremities for 1-2 minutes, eyes open no deviation . Short postictal period (10 min)  poor sleep habits due to stress.  He is currently taking VPA 500mg XR BID and Zonegran 300 mg qhs. He has not had any seizures in over a year. Was feeling very anxious in the mornings when he was starting VPA but now is feeling back to normal. He believes he had increased seizure frequency before due to stress of moving in with his girlfriend and her daughter and less sleep. He does not drive. Previous meds: Keppra

## 2024-06-19 NOTE — ASSESSMENT
[FreeTextEntry1] : PHIL GAMBLE is a 42 yo M with nocturnal generalized epilepsy well controlled on vpa and zonisamide. due to SE from VPA I will consider dc vpa next time and increase zonisamide to 300 bid.  amb eeg, blood work today including genetic testing

## 2024-07-01 LAB
25(OH)D3 SERPL-MCNC: 29.4 NG/ML
ALBUMIN SERPL ELPH-MCNC: 4.6 G/DL
ALP BLD-CCNC: 110 U/L
ALT SERPL-CCNC: 61 U/L
ANION GAP SERPL CALC-SCNC: 10 MMOL/L
APPEARANCE: CLEAR
AST SERPL-CCNC: 34 U/L
BACTERIA: NEGATIVE /HPF
BASOPHILS # BLD AUTO: 0.04 K/UL
BASOPHILS NFR BLD AUTO: 0.7 %
BILIRUB SERPL-MCNC: 0.3 MG/DL
BILIRUBIN URINE: NEGATIVE
BLOOD URINE: NEGATIVE
BUN SERPL-MCNC: 13 MG/DL
CALCIUM SERPL-MCNC: 9.5 MG/DL
CAST: 0 /LPF
CHLORIDE SERPL-SCNC: 106 MMOL/L
CHOLEST SERPL-MCNC: 250 MG/DL
CO2 SERPL-SCNC: 23 MMOL/L
COLOR: YELLOW
CREAT SERPL-MCNC: 1.15 MG/DL
EGFR: 82 ML/MIN/1.73M2
EOSINOPHIL # BLD AUTO: 0.35 K/UL
EOSINOPHIL NFR BLD AUTO: 5.8 %
EPITHELIAL CELLS: 0 /HPF
ESTIMATED AVERAGE GLUCOSE: 137 MG/DL
GLUCOSE QUALITATIVE U: NEGATIVE MG/DL
GLUCOSE SERPL-MCNC: 121 MG/DL
HBA1C MFR BLD HPLC: 6.4 %
HCT VFR BLD CALC: 43.9 %
HDLC SERPL-MCNC: 32 MG/DL
HGB BLD-MCNC: 15.1 G/DL
IMM GRANULOCYTES NFR BLD AUTO: 0.3 %
KETONES URINE: NEGATIVE MG/DL
LDLC SERPL CALC-MCNC: 155 MG/DL
LEUKOCYTE ESTERASE URINE: NEGATIVE
LYMPHOCYTES # BLD AUTO: 2.41 K/UL
LYMPHOCYTES NFR BLD AUTO: 39.7 %
MAN DIFF?: NORMAL
MCHC RBC-ENTMCNC: 30 PG
MCHC RBC-ENTMCNC: 34.4 GM/DL
MCV RBC AUTO: 87.3 FL
MICROSCOPIC-UA: NORMAL
MONOCYTES # BLD AUTO: 0.61 K/UL
MONOCYTES NFR BLD AUTO: 10 %
NEUTROPHILS # BLD AUTO: 2.64 K/UL
NEUTROPHILS NFR BLD AUTO: 43.5 %
NITRITE URINE: NEGATIVE
NONHDLC SERPL-MCNC: 218 MG/DL
PH URINE: 7
PLATELET # BLD AUTO: 194 K/UL
POTASSIUM SERPL-SCNC: 4.7 MMOL/L
PROT SERPL-MCNC: 7.6 G/DL
PROTEIN URINE: NEGATIVE MG/DL
RBC # BLD: 5.03 M/UL
RBC # FLD: 12.9 %
RED BLOOD CELLS URINE: 0 /HPF
SODIUM SERPL-SCNC: 139 MMOL/L
SPECIFIC GRAVITY URINE: 1.02
TRIGL SERPL-MCNC: 333 MG/DL
TSH SERPL-ACNC: 3.45 UIU/ML
UROBILINOGEN URINE: 0.2 MG/DL
VALPROATE SERPL-MCNC: 46 UG/ML
WBC # FLD AUTO: 6.07 K/UL
WHITE BLOOD CELLS URINE: 0 /HPF
ZONISAMIDE SERPL-MCNC: 13.8 UG/ML

## 2024-07-26 ENCOUNTER — APPOINTMENT (OUTPATIENT)
Dept: NEUROLOGY | Facility: CLINIC | Age: 44
End: 2024-07-26

## 2024-07-26 PROCEDURE — 95816 EEG AWAKE AND DROWSY: CPT

## 2024-07-27 PROCEDURE — 95708 EEG WO VID EA 12-26HR UNMNTR: CPT

## 2024-07-27 PROCEDURE — 95719 EEG PHYS/QHP EA INCR W/O VID: CPT

## 2024-07-27 PROCEDURE — 95700 EEG CONT REC W/VID EEG TECH: CPT

## 2024-07-29 ENCOUNTER — APPOINTMENT (OUTPATIENT)
Dept: NEUROLOGY | Facility: CLINIC | Age: 44
End: 2024-07-29

## 2024-08-09 NOTE — ED PROVIDER NOTE - CLINICAL SUMMARY MEDICAL DECISION MAKING FREE TEXT BOX
Maty Gilman, was evaluated through a synchronous (real-time) audio-video encounter. The patient (or guardian if applicable) is aware that this is a billable service, which includes applicable co-pays. This Virtual Visit was conducted with patient's (and/or legal guardian's) consent. Patient identification was verified, and a caregiver was present when appropriate.   The patient was located at Home: [address] [place] [id number]  Provider was located at Home (Appt Dept State): [place]  Confirm you are appropriately licensed, registered, or certified to deliver care in the state where the patient is located as indicated above. If you are not or unsure, please re-schedule the visit: Yes, I confirm.     Maty Gilman (:  1960) is a Established patient, presenting virtually for evaluation of the following:  Chief Complaint   Patient presents with    Hand Injury     Right hand injury         Assessment & Plan   Below is the assessment and plan developed based on review of pertinent history, physical exam, labs, studies, and medications.  1. Right hand pain  Possible soft tissue trauma, or tendinitis, doubt fracture at this time, we discussed about conservative therapy, heat or ice, and anti-inflammatories as needed, consider diclofenac ointment, and gentle exercises, if worsening may consider x-ray  No follow-ups on file.       Subjective   She is concerned about her right hand pain that is that he has did that after trying to open a container, she felt a pop and it started hurting, she is concerned about fractures, she reports a small bruise, and mild swelling, she reports no issues with mobility, pain is located more in between index finger and middle finger    Hand Injury       Review of Systems   Musculoskeletal:  Positive for arthralgias. Negative for joint swelling.          Objective   Patient-Reported Vitals  No data recorded     Physical Exam  Vitals reviewed.   Constitutional:   otitis externa, TM unable to be visualized on R   oral and otic abx drops  f/u pcp   return precautions

## 2024-08-10 ENCOUNTER — EMERGENCY (EMERGENCY)
Facility: HOSPITAL | Age: 44
LOS: 1 days | Discharge: HOME | End: 2024-08-10
Attending: INTERNAL MEDICINE | Admitting: INTERNAL MEDICINE
Payer: SELF-PAY

## 2024-08-10 VITALS
TEMPERATURE: 98 F | WEIGHT: 190.04 LBS | OXYGEN SATURATION: 99 % | DIASTOLIC BLOOD PRESSURE: 86 MMHG | HEART RATE: 97 BPM | HEIGHT: 65 IN | SYSTOLIC BLOOD PRESSURE: 122 MMHG | RESPIRATION RATE: 17 BRPM
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
## 2024-08-10 PROCEDURE — 71045 X-RAY EXAM CHEST 1 VIEW: CPT

## 2024-08-10 PROCEDURE — 99284 EMERGENCY DEPT VISIT MOD MDM: CPT

## 2024-08-10 PROCEDURE — 99284 EMERGENCY DEPT VISIT MOD MDM: CPT | Mod: 25

## 2024-08-10 PROCEDURE — 72110 X-RAY EXAM L-2 SPINE 4/>VWS: CPT

## 2024-08-10 PROCEDURE — 72110 X-RAY EXAM L-2 SPINE 4/>VWS: CPT | Mod: 26

## 2024-08-10 PROCEDURE — 71045 X-RAY EXAM CHEST 1 VIEW: CPT | Mod: 26

## 2024-08-10 RX ORDER — CYCLOBENZAPRINE HCL 10 MG
1 TABLET ORAL
Qty: 30 | Refills: 0
Start: 2024-08-10 | End: 2024-08-19

## 2024-08-10 RX ORDER — IBUPROFEN 200 MG
600 TABLET ORAL ONCE
Refills: 0 | Status: COMPLETED | OUTPATIENT
Start: 2024-08-10 | End: 2024-08-10

## 2024-08-10 RX ORDER — IBUPROFEN 200 MG
1 TABLET ORAL
Qty: 40 | Refills: 0
Start: 2024-08-10 | End: 2024-08-19

## 2024-08-10 RX ORDER — LIDOCAINE 5% 5 G/100G
1 CREAM TOPICAL ONCE
Refills: 0 | Status: COMPLETED | OUTPATIENT
Start: 2024-08-10 | End: 2024-08-10

## 2024-08-10 RX ORDER — LIDOCAINE 5% 5 G/100G
1 CREAM TOPICAL
Qty: 2 | Refills: 0
Start: 2024-08-10 | End: 2024-08-19

## 2024-08-10 RX ADMIN — LIDOCAINE 5% 1 PATCH: 5 CREAM TOPICAL at 19:00

## 2024-08-10 RX ADMIN — Medication 600 MILLIGRAM(S): at 18:00

## 2024-08-10 RX ADMIN — LIDOCAINE 5% 1 PATCH: 5 CREAM TOPICAL at 18:00

## 2024-08-10 RX ADMIN — Medication 600 MILLIGRAM(S): at 19:00

## 2024-08-10 NOTE — ED PROVIDER NOTE - NSFOLLOWUPINSTRUCTIONS_ED_ALL_ED_FT
Follow up with your PMD within 1-2 days.  Rest, increase your fluids, advance your activity as tolerated.   Take all of your other medications as previously prescribed.   Worsening, continued or ANY new concerning symptoms return to the emergency department.  Motrin every 6 hours as needed for pain lidocaine patch 12 hours on 12 hours off and Flexeril 10 mg 3 times a day patient recommended not to drive when taking Flexeril

## 2024-08-10 NOTE — ED ADULT TRIAGE NOTE - GLASGOW COMA SCALE: BEST VERBAL RESPONSE, MLM
FAMILY HISTORY:  Father  Still living? Unknown  Family history of hypercholesterolemia, Age at diagnosis: Age Unknown    Mother  Still living? Unknown  Family history of hypothyroidism, Age at diagnosis: Age Unknown    
(V5) oriented
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
## 2024-08-10 NOTE — ED PROVIDER NOTE - PHYSICAL EXAMINATION
General:     NAD, well-nourished, well-appearing  Head:     NC/AT, EOMI, oral mucosa moist  Neck:     trachea midline  Lungs:     CTA b/l, no w/r/r  CVS:     S1S2, RRR, no m/g/r  Abd:     +BS, s/nt/nd, no organomegaly  Ext:    2+ radial and pedal pulses, no c/c/e  Neuro: AAOx3, no sensory/motor deficits  MSK–positive right paraspinal lumbar tenderness, right intrascapular tenderness negative straight leg raising test normal gait

## 2024-08-10 NOTE — ED PROVIDER NOTE - PATIENT PORTAL LINK FT
You can access the FollowMyHealth Patient Portal offered by [facility] by registering at the following website: [email or web address]. By joining [facility]’s FollowMyHealth portal, you will also be able to view your health information using other applications (apps) compatible with our system.

## 2024-08-10 NOTE — ED PROVIDER NOTE - CARE PLAN
1 Principal Discharge DX:	Strain of thoracic region  Secondary Diagnosis:	Acute lumbar myofascial strain

## 2024-08-10 NOTE — ED ADULT TRIAGE NOTE - BP NONINVASIVE DIASTOLIC (MM HG)
Problem: Impaired Functional Mobility  Goal: Achieve highest/safest level of mobility/gait  Interventions:  - Assess patient's functional ability and stability  - Promote increasing activity/tolerance for mobility and gait  - Educate and engage patient/fam 86
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
## 2024-08-10 NOTE — ED ADULT TRIAGE NOTE - SOURCE OF INFORMATION
Health Maintenance Due   Topic Date Due   • Shingles Vaccine (2 of 3) 12/02/2014       Patient is up to date, no discussion needed.          3.) During the past 4 weeks, how would you rate your health?:  Fair     6 c.) How many servings of Fried or High Fat Foods do you have each day (1 serving = 1 [place], French Fries, chips, doughnut, fried chicken/fish):  2 per day     11b.) Bowel control problems:  Often     11f.) Feeling stressed or overwhelmed:  Often     11g.) Anger or frustration:  Often     11h.) Problems with your hearing:  Always     11j.) Problems with your balance:  Always     13.) Do you need help with any of the following activities?:  Get to places outside of walking distance (can't drive alone, or take a bus/taxi alone), Go shopping for groceries or clothes, Prepare a meal, Handle your own money          Patient

## 2024-08-16 ENCOUNTER — APPOINTMENT (OUTPATIENT)
Dept: INTERNAL MEDICINE | Facility: CLINIC | Age: 44
End: 2024-08-16

## 2024-09-27 ENCOUNTER — APPOINTMENT (OUTPATIENT)
Dept: NEUROLOGY | Facility: CLINIC | Age: 44
End: 2024-09-27

## 2024-11-25 NOTE — ED ADULT NURSE NOTE - RN DISCHARGE SIGNATURE
HOSPITALIST HISTORY AND PHYSICAL/ ADMISSION NOTE                Patient Privacy Notice     The 21st Century Cures Act makes medical notes like these available to patients in the interest of transparency. Please be advised that this is a medical document. Medical documents are intended to carry relevant information and the clinical opinion of the practitioner. The medical note is intended as medical provider to provider communication, and may appear blunt or direct. It is written in medical language, and may contain abbreviations or verbiage that are unfamiliar.    ATTENDING PHYSICIAN:    Sai Garcia MD  ADMITTING PHYSICIAN:  Sai Garcia MD  PRIMARY CARE PHYSICIAN:Sathya No MD     Consultant(s):   Neurology: Dr. Hadley      CHIEF COMPLAINT:    Transient dizziness and loss of vision right eye.    HISTORY OF PRESENT ILLNESS:    Martin Aranda is a 84 year old white male with  :   Medical History:   Diagnosis Date    Allergies     Aortic valve replaced 03/17/2022    Atrial fibrillation  (CMD) 2012    after heart surgery, [place]    Back pain     Bilateral cataracts 07/29/2021    Centrilobular emphysema  (CMD) 01/31/2023    Last Assessment & Plan:   Formatting of this note might be different from the original.  Hypoxemia at night is disproportionate to the CBT sleep apnea.  I suspect it is related to cardiopulmonary causes including his atrial fibrillation and possibly from COPD.  He does have emphysema review of CT scan in 2018.  We will obtain PFT.  We will also obtain 6-minute walk test.  Continue with albuterol f    Cerebral infarction (CMD) 1997    no residual, no cause found on eval    CHB (complete heart block)  (CMD) 04/20/2023    Post AVN ablation    CHB (complete heart block) S/P PPM 04/30/2023    Persistent A. Fib on AC Eliquis 4/21/23  PPM implantation and AVJ [id number]/14/23TTE:improved LV function.  50%LVEF Mildly decreased LVSF.LV RWMAregional wall motion  abnormalities (see diagram).  Mod P. HTN ; RVSP 40 mmHg.Moderately increased LA chamber size.Severely inc. RA chamber size. Mild a AV stenosis; mean gradient 6 mmHg, ROSELYN 1.5 cm2.Mild MV regurgitation. Moderate TV valve regurgitation.    Chickenpox     Chronic kidney disease     renal artery stenosis    Coronary artery disease     Coronary artery disease involving coronary bypass graft 03/17/2022    Persistent A. Fib on AC Eliquis 4/21/23  PPM implantation and AVJ [id number]/14/23TTE:improved LV function.  50%LVEF Mildly decreased LVSF.LV RWMAregional wall motion abnormalities (see diagram).  Mod P. HTN ; RVSP 40 mmHg.Moderately increased LA chamber size.Severely inc. RA chamber size. Mild a AV stenosis; mean gradient 6 mmHg, ROSELYN 1.5 cm2.Mild MV regurgitation. Moderate TV valve regurgitation.    COVID-19 12/2020    Also had 1/2023    Emphysema lung  (CMD)     Essential (primary) hypertension     Essential hypertension, benign 03/17/2022    H/O bladder infections     Heart valve disorder     AVR    High cholesterol     History of colon polyps     History of gout     Hx Cerebrovascular accident (CVA) due to thrombosis of right middle cerebral artery  (CMD) 03/17/2022    Hx Subdural hematoma, post-traumatic  (CMD) 03/17/2022    Leadless [employer] Pacemaker 04/30/2023    Persistent A. Fib on AC Eliquis 4/21/23  PPM implantation and AVJ [id number]/14/23TTE:improved LV function.  50%LVEF Mildly decreased LVSF.LV RWMAregional wall motion abnormalities (see diagram).  Mod P. HTN ; RVSP 40 mmHg.Moderately increased LA chamber size.Severely inc. RA chamber size. Mild a AV stenosis; mean gradient 6 mmHg, ROSELYN 1.5 cm2.Mild MV regurgitation. Moderate TV valve regurgitation.    Long term (current) use of anticoagulants 04/07/2023    Persistent A. Fib on AC Eliquis 4/21/23  PPM implantation and AVJ [id number]/14/23TTE:improved LV function.  50%LVEF Mildly decreased LVSF.LV RWMAregional wall motion abnormalities (see diagram).  Mod  P. HTN ; RVSP 40 mmHg.Moderately increased LA chamber size.Severely inc. RA chamber size. Mild a AV stenosis; mean gradient 6 mmHg, ROSELYN 1.5 cm2.Mild MV regurgitation. Moderate TV valve regurgitation.    LV (left ventricular) mural thrombus     Maxillary sinusitis, chronic     Migraine     Mixed hyperlipidemia 03/28/2017    Formatting of this note might be different from the original.  3/28/2020 , , , HDL 50      Mumps     Non-recurrent bilateral inguinal hernia without obstruction or gangrene 10/21/2024    Nonrheumatic mitral valve regurgitation 10/18/2022    Formatting of this note might be different from the original.  10/2022  Moderate on MARTHA      ROSSY (obstructive sleep apnea) 01/31/2023    Last Assessment & Plan:   Formatting of this note might be different from the original.  Hypoxia disproportionate to severity of sleep apnea.  However it seems to have corrected with the BiPAP.  We will start him on BiPAP 19/15 cm water pressure.  If patient becomes intolerant in future we may consider just supplemental oxygen.  Follow-up for initial compliance.  Request given we will set up a new    Pancreatic insufficiency (CMD) 01/2023    Paroxysmal tachycardia (CMD) 03/17/2022    Permanent atrial fibrillation S/P AVJ and PPM 04/07/2023    Persistent A. Fib on AC Eliquis 4/21/23  PPM implantation and AVJ [id number]/14/23TTE:improved LV function.  50%LVEF Mildly decreased LVSF.LV RWMAregional wall motion abnormalities (see diagram).  Mod P. HTN ; RVSP 40 mmHg.Moderately increased LA chamber size.Severely inc. RA chamber size. Mild a AV stenosis; mean gradient 6 mmHg, ROSELYN 1.5 cm2.Mild MV regurgitation. Moderate TV valve regurgitation.    Permanent atrial fibrillation S/P AVJ and PPM 04/07/2023    Persistent A. Fib on AC Eliquis 4/21/23  PPM implantation and AVJ [id number]/14/23TTE:improved LV function.  50%LVEF Mildly decreased LVSF.LV RWMAregional wall motion abnormalities (see diagram).  Mod P. HTN ; RVSP  40 mmHg.Moderately increased LA chamber size.Severely inc. RA chamber size. Mild a AV stenosis; mean gradient 6 mmHg, ROSELYN 1.5 cm2.Mild MV regurgitation. Moderate TV valve regurgitation.    Presence of prosthetic heart valve S/P AVR with bioprosthesis 04/22/2017    Formatting of this note might be different from the original.  8/3/12 # 25 Quique-Bergeron tissue.  Re: aortic stenosis      Renal artery stenosis (CMD) 04/23/2018    Formatting of this note might be different from the original.  4/19/18 Stent to R renal artery      S/P AVR (aortic valve replacement)     w/ CABG X3     Sleep apnea     uses CPAP    Urinary tract infection     Wears eyeglasses     who presents with the above complaint.     He was in his usual state of health until he woke up this morning and his CPAP was partially off.  At around 7:15 AM he had a bright light in his right eye and then it went completely black for a few seconds.  He had no vision for a few seconds, but then it came back to normal.  He had no issues with his left eye at that time.  He went back to sleep and woke up 2 hours later.  When he got up he had initial feeling of dizziness when standing up and seems slightly off balance according to his significant other.  And this lasted only about few seconds.  He has not fallen or hit his head.  No headache.  Mild diffuse posterior neck pain that he noticed today.  No new back pain. He noted that his blood pressure was elevated today, so he thought that may be the issue.  He did not notice any specific weakness in his arms or legs.  No focal numbness.  Significant other did note that his speech seemed somewhat slow today but not slurred.  No facial drooping.  No Chest pain, palpitations or shortness of breath.  No recent colds no cough, no fever.  No vomiting or diarrhea.  No dysuria or hematuria.  No eye pain.     No evidence for intracranial hemorrhage, mass effect, acute large vessel  territory infarct or acute intracranial  process.  Tonight he was brought in by the wife for evaluation in the ER.  Sodium 141, potassium 4.1, chloride 101, CO2 29, BUN 35, creatinine 1.5, blood sugar 95, hemoglobin 15.6, WBC 9000, platelet 100 90K.  EKG pacer rhythm at 70.  Chest x-ray no active pulmonary disease.  Urinalysis 5-10 WBC.  CT scan of the head: Stable RIGHT MCA infarct  CTA HEAD:  No acute large-vessel occlusion.  Multifocal intracranial atherosclerosis including high-grade left [id number] stenosis, moderate bilateral ICA stenosis, and mild to moderateright MCA stenosis.  CTA NECK:  No evidence of high-grade stenosis, dissection, or occlusion.Approximately 70% stenosis of the proximal right subclavian artery.Approximately 50% stenosis of the proximal left internal carotid artery.High-grade stenosis of the left vertebral artery origin.  [facility]vascular at [facility] consulted by ER with no recommendations for intervention at this time.  Neurology Dr. Hadley was consulted recommending the patient be admitted for further care and stroke workup including MRI.  Patient was also started on ASA.  No indication for thrombolytics.    PAST MEDICAL HISTORY:  AS ABOVE.    SURGICAL HISTORY:    Past Surgical History:   Procedure Laterality Date    Aortic valve replacement      done at same time at CABGx3, porcine    Appendectomy      Av node ablation/implant  2023    Cardiac surgery      CABG x3     Colonoscopy w/ polypectomy      Echocardiogram  2022    Eye surgery Bilateral     LASIK     Eye surgery Left 2020    cataract extraction w/IOL - Dr Marlo Lazar     Nm erick per rst/strs pharmacolo  2024    Pacemaker  2023    Leadless MDT    Radiofrequency ablation  2023    Renal artery stent Right     Tonsillectomy         FAMILY HISTORY:    Family History   Problem Relation Age of Onset    Heart disease Father      Family Status   Relation Name Status    Mo      Fa      Sis Nay Alive    Bro Bill Alive     Delores Moore Alive    Son Murali Alive    Ester Licea Alive    Delores Stevens Alive    Son Fabian Alive    Son Hermelinda    No partnership data on file     SOCIAL HISTORY:    Social History     Social History Narrative    Social History    .  5 children.  Lives in [place].    Tobacco History    Smoking Status Former Smoking Start Date  Quit [id number] Average Packs/Day    2.0 packs/day for 20.0 years (40.0 ttl pk-yrs) Smoking Tobacco T    Smokeless Tobacco Use    Never    Tobacco Comments    2021- \"Quit 30 +years ago\"    Alcohol History    Alcohol Use Status    Occasionally once a month    Drug Use    Drug Use Status    Never        LENIN MORGAN Spouse [phone]    Fabian Morgan Son   [phone]        PMH:    Persistent A. Fib on AC Eliquis 23  PPM implantation and AVJ [id number]/14/23TTE:improved LV function.  50%LVEF Mildly decreased LVSF.LV RWMAregional wall motion abnormalities (see diagram).    Mod P. HTN ; RVSP 40 mmHg.Moderately increased LA chamber size.Severely inc. RA chamber size. Mild a AV stenosis; mean gradient 6 mmHg, ROSELNY 1.5 cm2.Mild MV regurgitation. Moderate TV valve regurgitation.    RVSP:; 40 mmHg.                    CURRENT MEDICATIONS:    Medications Prior to Admission   Medication Sig Dispense Refill    amoxicillin (AMOXIL) 500 MG capsule Take 4 capsules by mouth 1 hour prior to dental work 4 capsule 3    potassium citrate ER 10 MEQ (1080 MG) Tab CR Take 1 tablet by mouth in the morning and 1 tablet at noon and 1 tablet in the evening. Take with meals. 270 tablet 1    umeclidinium bromide (Incruse Ellipta) 62.5 MCG/ACT inhaler Inhale 1 puff into the lungs daily. 30 each 5    amLODIPine (NORVASC) 5 MG tablet Take 1.5 tablets by mouth daily. 60 tablet 0    nitroGLYCERIN (Nitrostat) 0.4 MG sublingual tablet Place 1 tablet under the tongue every 5 minutes as needed for Chest pain. 25 tablet 1    metoPROLOL succinate (TOPROL-XL) 50 MG 24 hr tablet Take  1 tablet by mouth nightly. 90 tablet 3    hydroCHLOROthiazide 25 MG tablet TAKE 1 TABLET BY MOUTH DAILY 90 tablet 1    cetirizine (ZyrTEC) 10 MG tablet Take 10 mg by mouth daily.      apixaBAN (Eliquis) 5 MG Tab Take 1 tablet by mouth every 12 hours. 60 tablet     NON FORMULARY Take by mouth every morning. OTC vitamin E capsule once every morning      tamsulosin (Flomax) 0.4 MG Cap Take 1 capsule by mouth daily after a meal. 90 capsule 0    fluticasone-salmeterol (Advair Diskus) 500-50 MCG/ACT inhaler Inhale 1 puff into the lungs in the morning and 1 puff in the evening. 1 each 11    albuterol 108 (90 Base) MCG/ACT inhaler Inhale 2 puffs into the lungs every 4 hours as needed for Wheezing. 1 each 1    rosuvastatin (CRESTOR) 20 MG tablet Take 1 tablet by mouth nightly. 90 tablet 3    ezetimibe (ZETIA) 10 MG tablet Take 1 tablet by mouth daily. 90 tablet 1    ferrous sulfate 325 (65 FE) MG EC tablet Take 325 mg by mouth daily.      polyethylene glycol (MIRALAX) 17 g packet Take 17 g by mouth daily as needed (Constipation). Stir and dissolve powder in any 4 to 8 ounces of beverage, then drink.      VITAMIN D PO Take 2,000 abby-units by mouth daily.      MELATONIN PO Take 1 tablet by mouth nightly as needed. For sleep      MAGNESIUM PO Take 1 tablet by mouth daily.      Ascorbic Acid (VITAMIN C PO) Take 1 tablet by mouth daily.      Cyanocobalamin (B-12 PO) Take 1 tablet by mouth daily.      latanoprost (XALATAN) 0.005 % ophthalmic solution Place 1 drop into both eyes nightly.       Current Facility-Administered Medications   Medication Dose Route Frequency Provider Last Rate Last Admin    sodium chloride 0.9 % injection 10 mL  10 mL Intravenous PRN Naren Kiran MD        sodium chloride 0.9 % injection 2 mL  2 mL Intracatheter 2 times per day Naren Kiran MD        [START ON 11/25/2024] amLODIPine (NORVASC) tablet 7.5 mg  7.5 mg Oral Daily Sai Garcia MD        [START ON 11/25/2024] apixaBAN (ELIQUIS)  tablet 5 mg  5 mg Oral 2 times per day Sai Garcia MD        [START ON 11/25/2024] ascorbic acid (VITAMIN C) tablet 500 mg  500 mg Oral Daily Sai Garcia MD        [START ON 11/25/2024] ezetimibe (ZETIA) tablet 10 mg  10 mg Oral Daily Sai Garcia MD        [START ON 11/25/2024] fluticasone-vilanterol (BREO ELLIPTA) 200-25 MCG/ACT inhaler 1 puff  1 puff Inhalation Daily Resp Sia Garcia MD        [START ON 11/25/2024] hydroCHLOROthiazide tablet 25 mg  25 mg Oral Daily Sai Garcia MD        [START ON 11/25/2024] latanoprost (XALATAN) 0.005 % ophthalmic solution 1 drop  1 drop Both Eyes Nightly Sai Garcia MD        [START ON 11/25/2024] metoPROLOL succinate (TOPROL-XL) ER tablet 50 mg  50 mg Oral Nightly Sai Garcia MD        [START ON 11/25/2024] rosuvastatin (CRESTOR) tablet 20 mg  20 mg Oral Nightly Sai Garcia MD        [START ON 11/25/2024] tamsulosin (FLOMAX) capsule 0.4 mg  0.4 mg Oral Daily PC Sai Garcia MD        [START ON 11/25/2024] umeclidinium bromide (INCRUSE ELLIPTA) 62.5 MCG/ACT inhaler 1 puff  1 puff Inhalation Daily Resp Sai Garcia MD        sodium chloride (NORMAL SALINE) 0.9 % bolus 500 mL  500 mL Intravenous PRN Sai Garcia MD        acetaminophen (TYLENOL) tablet 650 mg  650 mg Oral Q4H PRN Sai Garcia MD        Or    acetaminophen (TYLENOL) suppository 650 mg  650 mg Rectal Q4H PRN Sai Garcia MD        nitroGLYCERIN (NITROSTAT) sublingual tablet 0.4 mg  0.4 mg Sublingual Q5 Min PRN Sai Garcia MD        polyethylene glycol (MIRALAX) packet 17 g  17 g Oral Daily PRN Sai Garcia MD        docusate sodium-sennosides (SENOKOT S) 50-8.6 MG 2 tablet  2 tablet Oral BID PRN Sai Garcia MD        bisacodyl (DULCOLAX) suppository 10 mg  10 mg Rectal Daily PRN Sai Garcia MD        magnesium hydroxide (MILK OF MAGNESIA) 400 MG/5ML suspension 30 mL  30 mL Oral Daily PRN Sai Garcia MD        melatonin tablet 3 mg  3 mg Oral Nightly PRN Sai Garcia,  MD        Potassium Standard Replacement Protocol (Levels 3.5 and lower)   Does not apply See Admin Instructions Sai Garcia MD        Potassium Replacement (Levels 3.6 - 4)   Does not apply See Admin Instructions Sai Garcia MD        Magnesium Standard Replacement Protocol   Does not apply See Admin Instructions Sai Garcia MD        [START ON 11/25/2024] pantoprazole (PROTONIX) EC tablet 40 mg  40 mg Oral QAM AC Sai Garcia MD        [START ON 11/25/2024] aspirin (ECOTRIN) enteric coated tablet 81 mg  81 mg Oral Daily Sai Garcia MD        labetalol (NORMODYNE) injection 10 mg  10 mg Intravenous Q30 Min PRN Sai Garcia MD        hydrALAZINE (APRESOLINE) injection 10 mg  10 mg Intravenous Once PRN Sai Garcia MD        prochlorperazine (COMPAZINE) tablet 5 mg  5 mg Oral Q4H PRN Sai Garcia MD        Or    prochlorperazine (COMPAZINE) injection 5 mg  5 mg Intravenous Q4H PRN Sai Garcia MD        docusate sodium-sennosides (SENOKOT S) 50-8.6 MG 2 tablet  2 tablet Oral Daily PRN Sai Garcia MD         ALLERGIES:    ALLERGIES:   Allergen Reactions    Amiodarone Other (See Comments)     Not sure of reaction - found out at time of heart surgery       REVIEW OF SYSTEMS:  Constitutional:  Denies fevers, chills, generalized/focal weakness, fatigue, loss of appetite.  Eyes:  Denies blurred vision, double vision.  HENT:  Denies  nasal congestion, rhinorrhea, epistaxis, sinus pain, mouth lesions or sore throat.    Respiratory:  Denies shortness of breath, cough, sputum production, hemoptysis or wheezing.    Cardiovascular:  Denies chest pains, palpitations, edema or vertigo.    Gastrointestinal: Denies abdominal pain, heartburn, nausea, vomiting, diarrhea, constipation or blood in stool.    Musculoskeletal:  Denies neck pain, back pain, joint pain, joint swelling or tenderness, muscle pains or spasms.   Neurologic:  Denies numbness, tingling, other sensory changes, sudden weakness in arms or  legs. Denies confusion, headache, dizziness, memory loss or tremors.    Genitourinary:  Denies urinary frequency, nocturia, urgency, incontinence, dysuria or hematuria.    Hematologic/Lymphatic:  Denies easy bruising or bleeding.  Endocrine:  Denies heat or cold intolerance, polydipsia or polyuria.   Integument:  Denies new rashes or lesions, pruritus or dryness of skin.    Psychiatric:  Denies anxiety, depression, hallucinations, irritability or sleeping problems.    Allergic/Immunologic:  Denies recurrent infections, hypersensitivity.       PHYSICAL EXAMINATION:    Vital 24 Hour Range Most Recent Value   Temperature Temp  Min: 97.6 °F (36.4 °C)  Max: 97.6 °F (36.4 °C) 97.6 °F (36.4 °C)   Pulse Pulse  Min: 69  Max: 89 69   Respiratory Resp  Min: 13  Max: 20 20   Blood Pressure BP  Min: 155/74  Max: 191/78 (!) 182/80   Pulse Oximetry SpO2  Min: 94 %  Max: 98 % 95 %   Arterial Blood Pressure No data recorded     O2 No data recorded       Vital Most Recent Value First Value   Weight 70.3 kg (155 lb) Weight: 70.3 kg (155 lb)   Height 5' 8\" (172.7 cm) Height: 5' 8\" (172.7 cm)   Body Mass Index 23.57 N/A     General:  Well-developed, well-nourished. In no apparent distress.    Eyes:  PERRL, EOMI(Pupils equal, round, reactive to light, extraocular movements intact). Conjunctivae pink. Sclerae anicteric.    HENT:  Normocephalic, atraumatic. Bilateral external ears are normal. Mucosal membranes moist. External nose is normal. Oropharynx is clear.    Neck:  Supple. Nontender. Normal range of motion. No masses. No thyromegaly.  Trachea midline.  Respiratory:  Normal respiratory effort. No chest wall tenderness. Lungs clear to auscultation bilaterally. Symmetrical chest expansion.    Cardiovascular:  Regular  rate and pacer rhythm.  1/6 to 2/6 systolic murmurs .No  rubs or gallops. Normal S1 and  S2. No JVD(jugular venous distension). No carotid bruits. Good dorsalis pedis pulses bilaterally. No peripheral  edema.     Gastrointestinal:  Soft. Nontender. Non-distended. Normal bowel sounds. No pulsatile or other abdominal masses. No hepatosplenomegaly.   Rectal: Deferred.   Genitalia:  Deferred.    Musculoskeletal:  No clubbing or cyanosis. Full range of motion in all 4 extremities proximal and distal.   Neurologic:  Alert and oriented times 3. Gait is normal. Normal sensory function. No  motor deficits in all 4 extremities. Cranial nerves II-XII intact. Symmetrical bilateral knee deep tendon reflexes.  Negative Babinski    Integumentary:  Warm. Dry. Pink. No rashes or lesions. No wounds.    Lymphatic: No supraclavicular or infraclavicular lymphadenopathy.   Psychiatric: Cooperative. Mood and affect :appropriate. Judgment normal .     LABS:    Recent Results (from the past 24 hour(s))   Comprehensive Metabolic Panel    Collection Time: 11/24/24  6:33 PM    Specimen: Blood, Venous   Result Value Ref Range    Fasting Status      Sodium 141 135 - 145 mmol/L    Potassium 4.1 3.4 - 5.1 mmol/L    Chloride 101 97 - 110 mmol/L    Carbon Dioxide 29 21 - 32 mmol/L    Anion Gap 15 7 - 19 mmol/L    Glucose 95 70 - 99 mg/dL    BUN 25 (H) 6 - 20 mg/dL    Creatinine 1.50 (H) 0.67 - 1.17 mg/dL    Glomerular Filtration Rate 46 (L) >=60    BUN/Cr 17 7 - 25    Calcium 10.0 8.4 - 10.2 mg/dL    Bilirubin, Total 1.0 0.2 - 1.0 mg/dL    GOT/AST 32 <=37 Units/L    GPT/ALT 30 <64 Units/L    Alkaline Phosphatase 103 45 - 117 Units/L    Albumin 4.4 3.4 - 5.0 g/dL    Protein, Total 7.8 6.4 - 8.2 g/dL    Globulin 3.4 2.0 - 4.0 g/dL    A/G Ratio 1.3 1.0 - 2.4   Prothrombin Time (INR/PT)    Collection Time: 11/24/24  6:33 PM    Specimen: Blood, Venous   Result Value Ref Range    Protime- PT 11.4 9.7 - 11.8 sec    INR 1.1     Partial Thromboplastin Time (PTT)    Collection Time: 11/24/24  6:33 PM    Specimen: Blood, Venous   Result Value Ref Range    PTT 30 22 - 32 sec   CBC with Automated Differential (performable only)    Collection Time: 11/24/24  6:33 PM     Specimen: Blood, Venous   Result Value Ref Range    WBC 9.0 4.2 - 11.0 K/mcL    RBC 4.88 4.50 - 5.90 mil/mcL    HGB 15.6 13.0 - 17.0 g/dL    HCT 45.8 39.0 - 51.0 %    MCV 93.9 78.0 - 100.0 fl    MCH 32.0 26.0 - 34.0 pg    MCHC 34.1 32.0 - 36.5 g/dL    RDW-CV 13.5 11.0 - 15.0 %    RDW-SD 46.5 39.0 - 50.0 fL     140 - 450 K/mcL    NRBC 0 <=0 /100 WBC    Neutrophil, Percent 76 %    Lymphocytes, Percent 12 %    Mono, Percent 10 %    Eosinophils, Percent 1 %    Basophils, Percent 1 %    Immature Granulocytes 0 %    Absolute Neutrophils 6.9 1.8 - 7.7 K/mcL    Absolute Lymphocytes 1.1 1.0 - 4.0 K/mcL    Absolute Monocytes 0.9 0.3 - 0.9 K/mcL    Absolute Eosinophils  0.1 0.0 - 0.5 K/mcL    Absolute Basophils 0.1 0.0 - 0.3 K/mcL    Absolute Immature Granulocytes 0.0 0.0 - 0.2 K/mcL   Electrocardiogram 12-Lead    Collection Time: 11/24/24  6:56 PM   Result Value Ref Range    Systolic Blood Pressure 164     Diastolic Blood Pressure 75     Ventricular Rate EKG/Min (BPM) 70     Atrial Rate (BPM) 227     QRS-Interval (MSEC) 150     QT-Interval (MSEC) 452     QTc 488     R Axis (Degrees) -97     T Axis (Degrees) 70     REPORT TEXT       Wide QRS rhythm  Nonspecific intraventricular block  Possible  Lateral infarct  , age undetermined  Abnormal ECG  When compared with ECG of  30-SEP-2024 13:48,  Wide QRS rhythm  has replaced  Electronic ventricular pacemaker     Urinalysis & Reflex Microscopy With Culture If Indicated    Collection Time: 11/24/24  9:33 PM    Specimen: Urine, Voided   Result Value Ref Range    COLOR, URINALYSIS Straw     APPEARANCE, URINALYSIS Clear     GLUCOSE, URINALYSIS Negative Negative mg/dL    BILIRUBIN, URINALYSIS Negative Negative    KETONES, URINALYSIS Negative Negative mg/dL    SPECIFIC GRAVITY, URINALYSIS 1.015 1.005 - 1.030    OCCULT BLOOD, URINALYSIS Negative Negative    PH, URINALYSIS 7.5 (H) 5.0 - 7.0    PROTEIN, URINALYSIS Negative Negative mg/dL    UROBILINOGEN, URINALYSIS 0.2 0.2, 1.0 mg/dL     NITRITE, URINALYSIS Negative Negative    LEUKOCYTE ESTERASE, URINALYSIS Moderate (A) Negative    SQUAMOUS EPITHELIAL, URINALYSIS 6 to 10 (A) None Seen, 1 to 5 /hpf    ERYTHROCYTES, URINALYSIS 1 to 2 None Seen, 1 to 2 /hpf    LEUKOCYTES, URINALYSIS 6 to 10 (A) None Seen, 1 to 5 /hpf    BACTERIA, URINALYSIS None Seen None Seen /hpf    HYALINE CASTS, URINALYSIS None Seen None Seen, 1 to 5 /lpf           ABG  No results found    IMAGING AND OTHER STUDIES:      CTA HEAD AND NECK   Final Result   Addendum (preliminary) 1 of 1   Addendum:      REVISION TO    ADDENDUM IMPRESSION:      CTA HEAD:   *  No acute large-vessel occlusion.   *  Multifocal intracranial atherosclerosis including high-grade left P2   segment stenosis, moderate bilateral ICA stenosis, and mild to moderate   right MCA stenosis.      CTA NECK:   *  No evidence of acute dissection, or occlusion.   *  Approximately 70% stenosis of the proximal right subclavian artery.   *  Approximately 50% stenosis of the proximal left internal carotid    artery.   *  High-grade stenosis of the left vertebral artery origin.      Electronically Signed by: Mauro Woodard MD   Signed on: 11/24/2024 9:00 PM   Created on Workstation ID: [id number]   Signed on Workstation ID: [id number]      Final   IMPRESSION:   CTA HEAD:   *  No acute large-vessel occlusion.   *  Multifocal intracranial atherosclerosis including high-grade left P2   segment stenosis, moderate bilateral ICA stenosis, and mild to moderate   right MCA stenosis.      CTA NECK:   *  No evidence of high-grade stenosis, dissection, or occlusion.   *  Approximately 70% stenosis of the proximal right subclavian artery.   *  Approximately 50% stenosis of the proximal left internal carotid artery.   *  High-grade stenosis of the left vertebral artery origin.         Electronically Signed by: Mauro Woodard MD   Signed on: 11/24/2024 8:35 PM   Created on Workstation ID: [id number]   Signed on Workstation ID:  20-Aug-2018 [id number]      CT HEAD WO CONTRAST   Final Result   IMPRESSION:      1. No evidence for intracranial hemorrhage, mass effect, acute large vessel   territory infarct or acute intracranial process.      Stable RIGHT MCA infarct            Electronically Signed by: Nestor Espino MD   Signed on: 11/24/2024 8:03 PM   Created on Workstation ID: [id number]   Signed on Workstation ID: [id number]      XR CHEST AP OR PA   Final Result   IMPRESSION:      1. There is no evidence for pneumonia, pneumothorax, pulmonary edema or   pleural effusion.             2. There is unchanged enlargement of the cardiac/pericardial silhouette.   Unchanged sternotomy wires, loop recorder      3. No evidence for mass effect on the trachea.            Electronically Signed by: Nestor Espino MD   Signed on: 11/24/2024 7:49 PM   Created on Workstation ID: [id number]   Signed on Workstation ID: [id number]      MRI BRAIN WO CONTRAST    (Results Pending)     CTA HEAD AND NECK    Addendum Date: 11/24/2024 Addendum:   Addendum: REVISION TO ADDENDUM IMPRESSION: CTA HEAD: *  No acute large-vessel occlusion. *  Multifocal intracranial atherosclerosis including high-grade left P2 segment stenosis, moderate bilateral ICA stenosis, and mild to moderate right MCA stenosis. CTA NECK: *  No evidence of acute dissection, or occlusion. *  Approximately 70% stenosis of the proximal right subclavian artery. *  Approximately 50% stenosis of the proximal left internal carotid artery. *  High-grade stenosis of the left vertebral artery origin. Electronically Signed by: Mauro Woodard MD Signed on: 11/24/2024 9:00 PM Created on Workstation ID: [id number] Signed on Workstation ID: [id number]    Result Date: 11/24/2024  Narrative: EXAM:  CTA HEAD AND NECK -- 11/24/2024 7:50 PM CLINICAL INDICATION:  84 years-old Male, history of     Neuro deficit, acute, stroke suspected COMPARISON: CT head, same date CONTRAST: Iohexol (Omnipaque-350) intravenous 75 mL TECHNIQUE:  CT  angiogram of the intracranial and extracranial circulation. Subsequently, venous phase CT head was performed.  Post-processed MIP and 3D reconstructions are archived to PACS.  Percent stenosis of the internal carotid artery is derived by using the distal internal carotid diameter as the denominator, referencing the [employer] Symptomatic Carotid Endarterectomy (NASCET) trial method. Intracranial stenosis measurements are derived using a method similar to the WASID (Warfarin vs. Aspirin Symptomatic Intracranial Disease) trial.  FINDINGS: CT HEAD WITH CONTRAST:  No significant change from recent prior CT. CTA NECK: Intravascular contrast opacification is considered diagnostic. Visualized Aortic Arch:  Mild scattered atherosclerotic plaque.  Standard arch branching pattern. Brachiocephalic: Non-stenotic atherosclerotic plaque. RIGHT Subclavian: Approximately 70% atherosclerotic stenosis of the proximal subclavian. LEFT Subclavian: Mild-moderate atherosclerosis with less than 30% stenosis. RIGHT Common Carotid: Non-stenotic atherosclerotic plaque. RIGHT Internal Carotid: Moderate atheromatous/calcific plaque yielding less than 50% stenosis. RIGHT External Carotid: Approximately 50% origin stenosis. LEFT Common Carotid: Non-stenotic atherosclerotic plaque. LEFT Internal Carotid: Moderate atheromatous/calcific plaque yielding approximately 50% stenosis. LEFT External Carotid: Non-stenotic atherosclerotic plaque. RIGHT Vertebral: Origin: Patent.  V1-V3: Patent. LEFT Vertebral: Origin: HIGH-GRADE STENOSIS at the ostium/proximal segment, estimated at 70%.  V1-V3: Patent. Non-vascular structures: Lung apices: Diffuse centrilobular emphysema.  Strandy fibrotic changes in the left apex. Thyroid: Normal Cervical spine: Moderate multilevel disc and facet degenerative changes. Potentially severe degrees of neuroforaminal stenosis which may be better assessed with MRI if clinically warranted.   Additional Findings: No acute  dental findings.    CTA HEAD: Anterior Circulation: RIGHT ICA:  Moderate atherosclerotic disease resulting in approximately 50% stenosis. LEFT ICA:  Moderate atherosclerotic disease resulting in approximately 50% stenosis. RIGHT MCA: Mild to moderate degrees of stenosis of the M1 and M2 segments. LEFT MCA: Mild atherosclerotic irregularity without high-grade stenosis. RIGHT ELIZABETH:  Patent LEFT ELIZABETH:  Patent Anterior communicator:  Patent Posterior Circulation: RIGHT Vertebral (V4):  Moderate atherosclerosis with less than 50% stenosis. LEFT Vertebral (V4):   Non-stenotic atherosclerotic plaque. Anterior/Posterior Inferior Cerebellar Arteries: Patent Superior Cerebellar Arteries:  Patent Basilar:  Patent RIGHT PCA:  Patent RIGHT Posterior communicator: Absent or very small. LEFT PCA: High-grade P2 segment stenosis. LEFT Posterior communicator: Absent or very small. CT Perfusion:  not performed __________    Impression: IMPRESSION: CTA HEAD: *  No acute large-vessel occlusion. *  Multifocal intracranial atherosclerosis including high-grade left P2 segment stenosis, moderate bilateral ICA stenosis, and mild to moderate right MCA stenosis. CTA NECK: *  No evidence of high-grade stenosis, dissection, or occlusion. *  Approximately 70% stenosis of the proximal right subclavian artery. *  Approximately 50% stenosis of the proximal left internal carotid artery. *  High-grade stenosis of the left vertebral artery origin. Electronically Signed by: Mauro Woodard MD Signed on: 11/24/2024 8:35 PM Created on Workstation ID: [id number] Signed on Workstation ID: [id number]    CT HEAD WO CONTRAST    Result Date: 11/24/2024  Narrative: EXAM: CT HEAD WO CONTRAST CLINICAL HISTORY:  84 years old Male presents with     Neuro deficit, acute, stroke suspected Notes: dizziness/lightheadedness, hypertension, and feeling generally weak. Notes when he woke up this morning, he saw a bright light out of his right eye. Patient then reports that  he lost complete vision out of his right eye, lasting about a few  minutes.    Denies chest pain, shortness of breath, or focal weakness. TECHNIQUE: Contiguous axial noncontrast images of the brain were obtained. One or more of the following dose reduction techniques were used: automated exposure control, adjustment of the mA and/or kV according to patient size, use of iterative reconstruction. COMPARISON: 2022 FINDINGS: There is no evidence for intracranial hemorrhage, mass effect, midline shift or acute large vessel territory infarct. Unchanged encephalomalacia related to RIGHT MCA infarct There is no evidence of obstructive hydrocephalus. No evidence for acute sinusitis or significant sinus disease.  The mastoid air cells are clear. No evidence for calvarial fracture.     Impression: IMPRESSION: 1. No evidence for intracranial hemorrhage, mass effect, acute large vessel territory infarct or acute intracranial process. Stable RIGHT MCA infarct Electronically Signed by: Nestor Espino MD Signed on: 11/24/2024 8:03 PM Created on Workstation ID: [id number] Signed on Workstation ID: [id number]    XR CHEST AP OR PA    Result Date: 11/24/2024  Narrative: EXAMINATION:  XR CHEST AP OR PA TECHNIQUE: XR CHEST AP OR PA CLINICAL HISTORY:  84 years old Male presents with     TIA dizziness/lightheadedness, hypertension, and feeling generally weak. Notes when he woke up this morning, he saw a bright light out of his right eye. Patient then reports that he lost complete vision out of his right eye, lasting about a few minutes.    Denies chest pain, shortness of breath, or focal weakness. COMPARISON: 2 months prior. FINDINGS and     Impression: IMPRESSION: 1. There is no evidence for pneumonia, pneumothorax, pulmonary edema or pleural effusion. 2. There is unchanged enlargement of the cardiac/pericardial silhouette. Unchanged sternotomy wires, loop recorder 3. No evidence for mass effect on the trachea. Electronically Signed by:  Nestor Espino MD Signed on: 11/24/2024 7:49 PM Created on Workstation ID: [id number] Signed on Workstation ID: [id number]      Results for orders placed or performed during the hospital encounter of 11/24/24   Electrocardiogram 12-Lead    Collection Time: 11/24/24  6:56 PM   Result Value Ref Range    Systolic Blood Pressure 164     Diastolic Blood Pressure 75     Ventricular Rate EKG/Min (BPM) 70     Atrial Rate (BPM) 227     QRS-Interval (MSEC) 150     QT-Interval (MSEC) 452     QTc 488     R Axis (Degrees) -97     T Axis (Degrees) 70     REPORT TEXT       Wide QRS rhythm  Nonspecific intraventricular block  Possible  Lateral infarct  , age undetermined  Abnormal ECG  When compared with ECG of  30-SEP-2024 13:48,  Wide QRS rhythm  has replaced  Electronic ventricular pacemaker       Ejection Fraction   Date Value Ref Range Status   12/14/2023 50 % Final       ASSESSMENT AND PLAN:    Martin Aranda is a 84 year old white male being admitted with:.  Principal Problem:    TIA (transient ischemic attack)  Active Problems:    Transient loss of vision of right eye    Dizziness and giddiness    Hx Cerebrovascular accident (CVA) due to thrombosis of right middle cerebral artery  (CMD)    Cerebrovascular disease    Plan is to admit the patient. Telemetry. CVA(cerebrovascular accident/TIA(transischemic attack) pathway, Monitored Order Sets.   CT scan of the head: Stable RIGHT MCA infarct  CTA HEAD:No acute large-vessel occlusion .Multifocal intracranial atherosclerosis including high-grade left [id number] stenosis, moderate bilateral ICA stenosis, and mild to moderateright MCA stenosis.  CTA NECK:No evidence of high-grade stenosis, dissection, or occlusion.Approximately 70% stenosis of the proximal right subclavian artery.Approximately 50% stenosis of the proximal left internal carotid artery.High-grade stenosis of the left vertebral artery origin.  [facility]vascular at [facility] consulted by ER with no recommendations for  intervention at this time.  Neurology Dr. Hadley  consulted no indication for tPA  .  Additionally no large vessel occlusion amenable for or need for neuro intervention-EVT.Recommended add ASA, and stroke workup including echo and MRI.  We may need to do the MRI outside of our facility if needed compatible pacemaker MRI.  Continue Eliquis and add ASA.  Neurological checks.  Hydration , correct any hypovolemia.  Allow permissive hypertension to < 220/110/ mm Hg .Blood pressure monitor and control with PRN(as needed) medications  labetalol and hydralazine.  Permissive hypertension allowed x 48H.. Eventual BP control: for optimal stroke prophylaxis ,longterm SBP goal <140 mm Hg).Maintenance medication.  Additional workup  Check B12, folate, ESR, TSH,   Check lipid profile,on Crestor.  OT and PT evaluation  Perform Stroke Education  CM ,Discharge planner and  to follow for dc planning.       CHRONIC/CONCURRENT DIAGNOSES:   Allergies     Aortic valve replaced 03/17/2022    Atrial fibrillation  (CMD) 2012    after heart surgery, [place]    Back pain     Bilateral cataracts 07/29/2021    Centrilobular emphysema  (CMD) 01/31/2023    Last Assessment & Plan:   Formatting of this note might be different from the original.  Hypoxemia at night is disproportionate to the CBT sleep apnea.  I suspect it is related to cardiopulmonary causes including his atrial fibrillation and possibly from COPD.  He does have emphysema review of CT scan in 2018.  We will obtain PFT.  We will also obtain 6-minute walk test.  Continue with albuterol f    Cerebral infarction (CMD) 1997    no residual, no cause found on eval    CHB (complete heart block)  (CMD) 04/20/2023    Post AVN ablation    CHB (complete heart block) S/P PPM 04/30/2023    Persistent A. Fib on AC Eliquis 4/21/23  PPM implantation and AVJ [id number]/14/23TTE:improved LV function.  50%LVEF Mildly decreased LVSF.LV RWMAregional wall motion abnormalities (see diagram).  Mod  P. HTN ; RVSP 40 mmHg.Moderately increased LA chamber size.Severely inc. RA chamber size. Mild a AV stenosis; mean gradient 6 mmHg, ROSELYN 1.5 cm2.Mild MV regurgitation. Moderate TV valve regurgitation.    Chickenpox     Chronic kidney disease     renal artery stenosis    Coronary artery disease     Coronary artery disease involving coronary bypass graft 03/17/2022    Persistent A. Fib on AC Eliquis 4/21/23  PPM implantation and AVJ [id number]/14/23TTE:improved LV function.  50%LVEF Mildly decreased LVSF.LV RWMAregional wall motion abnormalities (see diagram).  Mod P. HTN ; RVSP 40 mmHg.Moderately increased LA chamber size.Severely inc. RA chamber size. Mild a AV stenosis; mean gradient 6 mmHg, ROSELYN 1.5 cm2.Mild MV regurgitation. Moderate TV valve regurgitation.    COVID-19 12/2020    Also had 1/2023    Emphysema lung  (CMD)     Essential (primary) hypertension     Essential hypertension, benign 03/17/2022    H/O bladder infections     Heart valve disorder     AVR    High cholesterol     History of colon polyps     History of gout     Hx Cerebrovascular accident (CVA) due to thrombosis of right middle cerebral artery  (CMD) 03/17/2022    Hx Subdural hematoma, post-traumatic  (CMD) 03/17/2022    Leadless [employer] Pacemaker 04/30/2023    Persistent A. Fib on AC Eliquis 4/21/23  PPM implantation and AVJ [id number]/14/23TTE:improved LV function.  50%LVEF Mildly decreased LVSF.LV RWMAregional wall motion abnormalities (see diagram).  Mod P. HTN ; RVSP 40 mmHg.Moderately increased LA chamber size.Severely inc. RA chamber size. Mild a AV stenosis; mean gradient 6 mmHg, ROSELYN 1.5 cm2.Mild MV regurgitation. Moderate TV valve regurgitation.    Long term (current) use of anticoagulants 04/07/2023    Persistent A. Fib on AC Eliquis 4/21/23  PPM implantation and AVJ [id number]/14/23TTE:improved LV function.  50%LVEF Mildly decreased LVSF.LV RWMAregional wall motion abnormalities (see diagram).  Mod P. HTN ; RVSP 40 mmHg.Moderately  increased LA chamber size.Severely inc. RA chamber size. Mild a AV stenosis; mean gradient 6 mmHg, ROSELYN 1.5 cm2.Mild MV regurgitation. Moderate TV valve regurgitation.    LV (left ventricular) mural thrombus     Maxillary sinusitis, chronic     Migraine     Mixed hyperlipidemia 03/28/2017    Formatting of this note might be different from the original.  3/28/2020 , , , HDL 50      Mumps     Non-recurrent bilateral inguinal hernia without obstruction or gangrene 10/21/2024    Nonrheumatic mitral valve regurgitation 10/18/2022    Formatting of this note might be different from the original.  10/2022  Moderate on MARTHA      ROSSY (obstructive sleep apnea) 01/31/2023    Last Assessment & Plan:   Formatting of this note might be different from the original.  Hypoxia disproportionate to severity of sleep apnea.  However it seems to have corrected with the BiPAP.  We will start him on BiPAP 19/15 cm water pressure.  If patient becomes intolerant in future we may consider just supplemental oxygen.  Follow-up for initial compliance.  Request given we will set up a new    Pancreatic insufficiency (CMD) 01/2023    Paroxysmal tachycardia (CMD) 03/17/2022    Permanent atrial fibrillation S/P AVJ and PPM 04/07/2023    Persistent A. Fib on AC Eliquis 4/21/23  PPM implantation and AVJ [id number]/14/23TTE:improved LV function.  50%LVEF Mildly decreased LVSF.LV RWMAregional wall motion abnormalities (see diagram).  Mod P. HTN ; RVSP 40 mmHg.Moderately increased LA chamber size.Severely inc. RA chamber size. Mild a AV stenosis; mean gradient 6 mmHg, ROSELYN 1.5 cm2.Mild MV regurgitation. Moderate TV valve regurgitation.    Permanent atrial fibrillation S/P AVJ and PPM 04/07/2023    Persistent A. Fib on AC Eliquis 4/21/23  PPM implantation and AVJ [id number]/14/23TTE:improved LV function.  50%LVEF Mildly decreased LVSF.LV RWMAregional wall motion abnormalities (see diagram).  Mod P. HTN ; RVSP 40 mmHg.Moderately increased LA  chamber size.Severely inc. RA chamber size. Mild a AV stenosis; mean gradient 6 mmHg, ROSELYN 1.5 cm2.Mild MV regurgitation. Moderate TV valve regurgitation.    Presence of prosthetic heart valve S/P AVR with bioprosthesis 04/22/2017    Formatting of this note might be different from the original.  8/3/12 # 25 Quique-Bergeron tissue.  Re: aortic stenosis      Renal artery stenosis (CMD) 04/23/2018    Formatting of this note might be different from the original.  4/19/18 Stent to R renal artery      S/P AVR (aortic valve replacement)     w/ CABG X3     Sleep apnea     uses CPAP    Urinary tract infection     Wears eyeglasses       The patient is currently in stable condition.  We plan to proceed as follows:  Maintenance/Home medications reviewed and resume as appropriate.    DVT(Deep Venous Thrombosis) Prophylaxis: On Eliquis, SCDS(sequential compression devices) and TEDS.  Gastrointestinal Prophylaxis: Protonix .  Best Practice:  Completed  Case discussed with:  Patient and wife at bedside and ER provider.  CODE STATUS: DNR confirmed by the patient and the wife.  Tentative discharge Disposition: Home    Total time spent today on this visit is 75 minutes which includes preparing to see the patient by reviewing prior records, obtaining and reviewing history, performing a physical exam, management planning, counseling the patient, documenting clinical information in the medical record..   Patient is deemed high complexity for medical decision making.  Signed:  Sai Garcia MD,[facility]  Board Certified Internal Medicine  11/24/2024  11:09 PM

## 2024-12-24 PROBLEM — F10.90 ALCOHOL USE: Status: ACTIVE | Noted: 2017-08-10
